# Patient Record
Sex: FEMALE | Race: WHITE | URBAN - METROPOLITAN AREA
[De-identification: names, ages, dates, MRNs, and addresses within clinical notes are randomized per-mention and may not be internally consistent; named-entity substitution may affect disease eponyms.]

---

## 2024-02-19 ENCOUNTER — TELEPHONE (OUTPATIENT)
Dept: UROGYNECOLOGY | Age: 44
End: 2024-02-19

## 2024-02-19 ENCOUNTER — OFFICE VISIT (OUTPATIENT)
Dept: UROGYNECOLOGY | Age: 44
End: 2024-02-19
Payer: COMMERCIAL

## 2024-02-19 VITALS
SYSTOLIC BLOOD PRESSURE: 136 MMHG | HEART RATE: 73 BPM | RESPIRATION RATE: 14 BRPM | DIASTOLIC BLOOD PRESSURE: 97 MMHG | TEMPERATURE: 97.9 F | OXYGEN SATURATION: 97 %

## 2024-02-19 DIAGNOSIS — R39.89 BLADDER PAIN: Primary | ICD-10-CM

## 2024-02-19 DIAGNOSIS — N30.10 INTERSTITIAL CYSTITIS: ICD-10-CM

## 2024-02-19 DIAGNOSIS — R35.0 URINARY FREQUENCY: ICD-10-CM

## 2024-02-19 LAB
BILIRUBIN, POC: NORMAL
BLOOD URINE, POC: NORMAL
CLARITY, POC: NORMAL
COLOR, POC: NORMAL
EMPTY COUGH STRESS TEST: NORMAL
FIRST SENSATION: 75 CC
FULL COUGH STRESS TEST: NORMAL
GLUCOSE URINE, POC: NORMAL
KETONES, POC: NORMAL
LEUKOCYTE EST, POC: NORMAL
MAX SENSATION: 350 CC
NITRATE, URINE POC: NORMAL
NITRITE, POC: NORMAL
PH, POC: NORMAL
POST VOID RESIDUAL (PVR): 4 ML
PROTEIN, POC: NORMAL
RBC URINE, POC: NORMAL
SECOND SENSATION: 200 CC
SPASM: NORMAL
SPECIFIC GRAVITY, POC: NORMAL
UROBILINOGEN, POC: NORMAL
WBC URINE, POC: NORMAL

## 2024-02-19 PROCEDURE — 99204 OFFICE O/P NEW MOD 45 MIN: CPT | Performed by: NURSE PRACTITIONER

## 2024-02-19 PROCEDURE — 51700 IRRIGATION OF BLADDER: CPT | Performed by: NURSE PRACTITIONER

## 2024-02-19 PROCEDURE — 81002 URINALYSIS NONAUTO W/O SCOPE: CPT | Performed by: NURSE PRACTITIONER

## 2024-02-19 PROCEDURE — 51725 SIMPLE CYSTOMETROGRAM: CPT | Performed by: NURSE PRACTITIONER

## 2024-02-19 RX ORDER — TROSPIUM CHLORIDE ER 60 MG/1
60 CAPSULE ORAL EVERY MORNING
COMMUNITY

## 2024-02-19 RX ORDER — LIDOCAINE HYDROCHLORIDE 20 MG/ML
20 INJECTION, SOLUTION INFILTRATION; PERINEURAL ONCE
Status: COMPLETED | OUTPATIENT
Start: 2024-02-19 | End: 2024-02-19

## 2024-02-19 RX ORDER — VALACYCLOVIR HYDROCHLORIDE 1 G/1
1000 TABLET, FILM COATED ORAL EVERY 12 HOURS
COMMUNITY
Start: 2020-08-18

## 2024-02-19 RX ORDER — PHENAZOPYRIDINE 200 MG/1
200 TABLET, FILM COATED ORAL
COMMUNITY
Start: 2024-01-31 | End: 2024-05-08

## 2024-02-19 RX ORDER — ACYCLOVIR 400 MG/1
400 TABLET ORAL 2 TIMES DAILY
COMMUNITY

## 2024-02-19 RX ORDER — HEPARIN SODIUM 10000 [USP'U]/ML
10000 INJECTION, SOLUTION INTRAVENOUS; SUBCUTANEOUS ONCE
Status: COMPLETED | OUTPATIENT
Start: 2024-02-19 | End: 2024-02-19

## 2024-02-19 RX ORDER — ESCITALOPRAM OXALATE 10 MG/1
10 TABLET ORAL DAILY
COMMUNITY
Start: 2013-04-28

## 2024-02-19 RX ORDER — DROSPIRENONE 4 MG/1
4 TABLET, FILM COATED ORAL DAILY
COMMUNITY
Start: 2022-11-30

## 2024-02-19 RX ORDER — NITROFURANTOIN MACROCRYSTALS 50 MG/1
CAPSULE ORAL
COMMUNITY

## 2024-02-19 RX ADMIN — HEPARIN SODIUM 10000 UNITS: 10000 INJECTION, SOLUTION INTRAVENOUS; SUBCUTANEOUS at 10:15

## 2024-02-19 RX ADMIN — LIDOCAINE HYDROCHLORIDE 20 ML: 20 INJECTION, SOLUTION INFILTRATION; PERINEURAL at 10:15

## 2024-02-19 RX ADMIN — Medication 50 MEQ: at 10:15

## 2024-02-19 ASSESSMENT — ENCOUNTER SYMPTOMS: ABDOMINAL PAIN: 1

## 2024-02-19 NOTE — PROGRESS NOTES
2/19/2024      HPI:     Name: Debo Blackwell  YOB: 1980    CC: Patient is a 43 y.o. female who is seen in self referral from internet  for evaluation of  interstitial cystitis . Patient was diagnosed 17 years ago and had a hydrodistention.  She had a UTI in the fall, and her symptoms came back. She had another hydrodistention on 1/31/24. Patient reports she has hormone replacement pellets implanted.     HPI:  Patient presents today for consultation regarding IC diagnosis and treatment.    Debo was initially diagnosed with IC in 2006.  She recalls first episode being on her honeymoon, thinking it was a UTI.  She immediately sought care and as she did not have UTI, she was scheduled for Hydrodistention 2 days later  It sounds like this hydrodistention also had medication in it, possibly DSMO (she describes an odor associated with it)    She did watch her diet for some time and then did very well without even thinking about the diagnosis for many years.    This past Fall she began with bladder pain, pressure, frequency again.  She was evaluated by Urology for a few months, given antibiotics for possible UTI (Cultures negative), scheduled for Hydrodistention and urethral dilation which took place 1/31/2024  She gained no benefit or relief from this treatment.    She returned for her 2 week follow up and was placed on Elmiron, Trospium and Uribel  She has taken \"a few trospium\", (not consecutively) but did not start the Elmiron after researching its side effects  She is being very cautious about her diet  She has had only a few good days in the past month.    Bladder control problem: No   Bladder emptying problems:  No  Prolapse/Vaginal Support problems: No  Bowel problem(s): No   Sexual History:  reports being sexually active.  Pelvic Pain:  Yes   Where is your pain? Lower Abdomen  How long have you had pelvic pain? 4 months  Is your pain relieved by bladder emptying? No  Do you have pain with urination?

## 2024-02-20 RX ORDER — METHENAMINE, SODIUM PHOSPHATE, MONOBASIC, MONOHYDRATE, PHENYL SALICYLATE, METHYLENE BLUE, AND HYOSCYAMINE SULFATE 118; 40.8; 36; 10; .12 MG/1; MG/1; MG/1; MG/1; MG/1
118 CAPSULE ORAL 4 TIMES DAILY PRN
Qty: 30 CAPSULE | Refills: 1
Start: 2024-02-20 | End: 2024-02-20 | Stop reason: CLARIF

## 2024-02-20 RX ORDER — METHENAMINE, SODIUM PHOSPHATE, MONOBASIC, MONOHYDRATE, PHENYL SALICYLATE, METHYLENE BLUE, AND HYOSCYAMINE SULFATE 118; 40.8; 36; 10; .12 MG/1; MG/1; MG/1; MG/1; MG/1
118 CAPSULE ORAL 4 TIMES DAILY PRN
Qty: 30 CAPSULE | Refills: 0 | Status: SHIPPED | OUTPATIENT
Start: 2024-02-20

## 2024-02-20 RX ORDER — METHENAMINE, SODIUM PHOSPHATE, MONOBASIC, MONOHYDRATE, PHENYL SALICYLATE, METHYLENE BLUE, AND HYOSCYAMINE SULFATE 118; 40.8; 36; 10; .12 MG/1; MG/1; MG/1; MG/1; MG/1
118 CAPSULE ORAL 4 TIMES DAILY PRN
Qty: 30 CAPSULE | Refills: 1 | Status: SHIPPED | OUTPATIENT
Start: 2024-02-20 | End: 2024-02-20 | Stop reason: SDUPTHER

## 2024-02-20 NOTE — TELEPHONE ENCOUNTER
Patient called in and would like Uribel ordered due to her urologist not sending it in. Per Leana Garner NP, Uribel sent to preferred pharmacy. Patient is thankful and has no other concerns at this time. Electronically signed by Ammy Bazan RN on 2/20/2024 at 8:22 AM

## 2024-02-21 ENCOUNTER — TELEPHONE (OUTPATIENT)
Dept: UROGYNECOLOGY | Age: 44
End: 2024-02-21

## 2024-02-21 LAB
BACTERIA UR CULT: NORMAL
C TRACH DNA SPEC QL NAA+PROBE: NOT DETECTED
CANDIDA RRNA VAG QL PROBE: NOT DETECTED
CANDIDA RRNA VAG QL PROBE: NOT DETECTED
CARBAPENEM RESISTANCE OXA-48 GENE BY PCR: NOT DETECTED
CEPHALOSPORIN RESISTANCE AMPC GENE: NOT DETECTED
ESBL RESISTANCE: NOT DETECTED
G VAGINALIS RRNA GENITAL QL PROBE: NOT DETECTED
M HOMINIS DNA BLD QL NAA+PROBE: NOT DETECTED
MACROLIDE RESISTANCE: NOT DETECTED
METHICILLIN RESISTANCE: NOT DETECTED
MYCOPLASMA DNA SPEC QL NAA+PROBE: NOT DETECTED
N GONORRHOEA DNA SPEC QL NAA+PROBE: NOT DETECTED
OTHER MICROORG DNA SPEC QL NAA+PROBE: ABNORMAL
OTHER MICROORG DNA SPEC QL NAA+PROBE: DETECTED
QUINOLONE AND FLUOROQUINOLONE RESISTANCE: NOT DETECTED
T VAGINALIS RRNA SPEC QL NAA+PROBE: NOT DETECTED
TETRACYCLINE RESISTANCE: NOT DETECTED
TRIMETHOPRIM/SULFONAMIDE RESISTANCE: NOT DETECTED

## 2024-02-21 RX ORDER — DOXYCYCLINE HYCLATE 100 MG
100 TABLET ORAL 2 TIMES DAILY
Qty: 28 TABLET | Refills: 0 | Status: SHIPPED | OUTPATIENT
Start: 2024-02-21 | End: 2024-03-06

## 2024-02-21 NOTE — TELEPHONE ENCOUNTER
Patient returned call, reviewed results and treatment.   Advised of need for partner to be treated as well and to refrain from intercourse until antibiotics completed.  Verified pharmacy and prescription sent to [facility] in [place].  Patient verbalized good understanding all, will follow up as needed.

## 2024-02-21 NOTE — TELEPHONE ENCOUNTER
Called and left message for patient to return call regarding PCR results.  Per Leana Garner CNP PCR positive for ureaplasma so patient will need to take doxycycline 100mg twice daily for 14 days.  Patient's partner will also need to be treated and she should refrain from intercourse until antibiotics completed.  Will discuss with patient when she calls back and verify pharmacy for prescription to be sent.

## 2024-02-23 ENCOUNTER — TELEPHONE (OUTPATIENT)
Dept: UROGYNECOLOGY | Age: 44
End: 2024-02-23

## 2024-02-23 NOTE — TELEPHONE ENCOUNTER
Pt recently started treatment for Ureaplasma, has first and second instillations scheduled during treatment course. Will wait to complete abx before instillations. Patient verbalizes understanding of all of the above, and has no further questions or concerns at this time. Encouraged to call back the office should any questions/concerns arise. 2/23/2024 at 3:23 PM.

## 2024-02-28 NOTE — PROGRESS NOTES
with IC in 2006 with successful treatment with Hydrodistention likely with DSMO.  Patient does not recall if Hunners lesion present, but does note at her recent Hydrodistention in January there was not.  She presents today for further consult and care.     -CMG: Reviewed with patient.  Slightly decreased capacity, no spasm, no JANIE, empties well     Urine sent for culture  Sexual health PCR sent     -Bladder pain:  Diagnosis and pathology reviewed with patient.  Discussed diagnosis based on ruling out other conditions and monitoring treatment progress.  Differential dx: UTI's vs IC vs OAB.  Patient given literature on bladder diet.  She was given bladder instillation and will return in one week to discuss outcomes.  She understands this may be a weekly treatment for some time.  She will not begin Elmiron or Trospium as given at her 2 week follow up from unsuccessful hyrdrodistention     -Referral to PFPT.  Patient understands the role this plays in her continued treatment. Advised of \"The IC solution\" book     -Urethral pain:  Urine sent for PCR   We had a long discussion regarding the differential diagnosis and did spend time discussing Ureaplasma/mycoplasma     F/U 1 week  Leana Garner, CIPRIANO - CNP      OBJECTIVE:  Ortho Screen:  Posture - mild rounded shoulders  Other Observation - WFL single limb stance  Palpation - moderate tightness in bilateral upper traps  Alignment -    ROM LEFT RIGHT Comments   Cervical Side Bend   Mild to Moderate limitations   Cervical Rotation   Mild limitations   Shoulder Flex      Shoulder Abd      Shoulder ER      Shoulder IR            Lumbar Side Bend   Mild to Moderate limitations   Lumbar Rotation   Mild limitations   Hip Flexion      Hip Abd      Hip ER      Hip IR      Hip Extension      Knee Ext      Knee Flex            Hamstring Flex    Moderate to Severe limitations   Piriformis   Moderate to Severe limitations                   Strength LEFT RIGHT Comments   Shoulder

## 2024-03-04 ENCOUNTER — TELEPHONE (OUTPATIENT)
Dept: UROGYNECOLOGY | Age: 44
End: 2024-03-04

## 2024-03-04 NOTE — TELEPHONE ENCOUNTER
Pt is scheduled for instillation this Thursday. I informed her that oral sex will not interfere with ureaplasma/Doxycyline treatment. Pt has 2 days left of doxycycline.  Patient verbalizes understanding of all of the above, and has no further questions or concerns at this time. Encouraged to call back the office should any questions/concerns arise. 3/4/2024 at 12:56 PM.

## 2024-03-06 ENCOUNTER — HOSPITAL ENCOUNTER (OUTPATIENT)
Dept: PHYSICAL THERAPY | Age: 44
Setting detail: THERAPIES SERIES
Discharge: HOME | End: 2024-03-06
Payer: COMMERCIAL

## 2024-03-06 PROCEDURE — 97140 MANUAL THERAPY 1/> REGIONS: CPT | Performed by: PHYSICAL THERAPIST

## 2024-03-06 PROCEDURE — 97162 PT EVAL MOD COMPLEX 30 MIN: CPT | Performed by: PHYSICAL THERAPIST

## 2024-03-06 PROCEDURE — 97112 NEUROMUSCULAR REEDUCATION: CPT | Performed by: PHYSICAL THERAPIST

## 2024-03-06 PROCEDURE — 97110 THERAPEUTIC EXERCISES: CPT | Performed by: PHYSICAL THERAPIST

## 2024-03-06 PROCEDURE — 97530 THERAPEUTIC ACTIVITIES: CPT | Performed by: PHYSICAL THERAPIST

## 2024-03-07 ENCOUNTER — OFFICE VISIT (OUTPATIENT)
Dept: UROGYNECOLOGY | Age: 44
End: 2024-03-07

## 2024-03-07 VITALS
SYSTOLIC BLOOD PRESSURE: 136 MMHG | HEART RATE: 64 BPM | OXYGEN SATURATION: 99 % | TEMPERATURE: 97.7 F | RESPIRATION RATE: 16 BRPM | DIASTOLIC BLOOD PRESSURE: 87 MMHG

## 2024-03-07 DIAGNOSIS — N34.1 NGU DUE TO UREAPLASMA UREALYTICUM: ICD-10-CM

## 2024-03-07 DIAGNOSIS — N30.10 INTERSTITIAL CYSTITIS: ICD-10-CM

## 2024-03-07 DIAGNOSIS — R39.89 BLADDER PAIN: Primary | ICD-10-CM

## 2024-03-07 DIAGNOSIS — A49.3 NGU DUE TO UREAPLASMA UREALYTICUM: ICD-10-CM

## 2024-03-07 DIAGNOSIS — R35.0 URINARY FREQUENCY: ICD-10-CM

## 2024-03-07 LAB
BILIRUBIN, POC: NORMAL
BLOOD URINE, POC: NORMAL
CLARITY, POC: CLEAR
COLOR, POC: YELLOW
GLUCOSE URINE, POC: NORMAL
KETONES, POC: NORMAL
LEUKOCYTE EST, POC: NORMAL
NITRITE, POC: NORMAL
PH, POC: 6.5
PROTEIN, POC: NORMAL
SPECIFIC GRAVITY, POC: 1.02
UROBILINOGEN, POC: 0.2

## 2024-03-07 RX ORDER — LIDOCAINE HYDROCHLORIDE 20 MG/ML
20 INJECTION, SOLUTION INFILTRATION; PERINEURAL ONCE
Status: COMPLETED | OUTPATIENT
Start: 2024-03-07 | End: 2024-03-07

## 2024-03-07 RX ORDER — HEPARIN SODIUM 10000 [USP'U]/ML
10000 INJECTION, SOLUTION INTRAVENOUS; SUBCUTANEOUS ONCE
Status: COMPLETED | OUTPATIENT
Start: 2024-03-07 | End: 2024-03-07

## 2024-03-07 RX ADMIN — LIDOCAINE HYDROCHLORIDE 20 ML: 20 INJECTION, SOLUTION INFILTRATION; PERINEURAL at 16:06

## 2024-03-07 RX ADMIN — HEPARIN SODIUM 10000 UNITS: 10000 INJECTION, SOLUTION INTRAVENOUS; SUBCUTANEOUS at 15:57

## 2024-03-07 RX ADMIN — Medication 5 MEQ: at 16:07

## 2024-03-09 LAB
C TRACH DNA SPEC QL NAA+PROBE: NOT DETECTED
CANDIDA RRNA VAG QL PROBE: NOT DETECTED
CANDIDA RRNA VAG QL PROBE: NOT DETECTED
CARBAPENEM RESISTANCE OXA-48 GENE BY PCR: NOT DETECTED
CEPHALOSPORIN RESISTANCE AMPC GENE: NOT DETECTED
ESBL RESISTANCE: NOT DETECTED
G VAGINALIS RRNA GENITAL QL PROBE: NOT DETECTED
M HOMINIS DNA BLD QL NAA+PROBE: NOT DETECTED
MACROLIDE RESISTANCE: NOT DETECTED
METHICILLIN RESISTANCE: NOT DETECTED
MYCOPLASMA DNA SPEC QL NAA+PROBE: NOT DETECTED
N GONORRHOEA DNA SPEC QL NAA+PROBE: NOT DETECTED
OTHER MICROORG DNA SPEC QL NAA+PROBE: ABNORMAL
OTHER MICROORG DNA SPEC QL NAA+PROBE: NOT DETECTED
QUINOLONE AND FLUOROQUINOLONE RESISTANCE: NOT DETECTED
T VAGINALIS RRNA SPEC QL NAA+PROBE: NOT DETECTED
TETRACYCLINE RESISTANCE: ABNORMAL
TRIMETHOPRIM/SULFONAMIDE RESISTANCE: NOT DETECTED

## 2024-03-12 NOTE — PROGRESS NOTES
diaphragmatic breathing and targeted stretches.       GOALS:  Patient stated goal:  little to no pain daily and with sex    [x] Progressing: [] Met: [] Not Met: [] Adjusted        Therapist goals for Patient:      Short Term Goals: To be achieved in: 4-5 sessions     1. Patient will have a decrease in pelvic/abdominal/bladder pain and/or urinary urgency and frequency  to indicate improvement in pelvic floor strength/motor control and relaxation, muscle coordination, improved intraabdominal/intrapelvic pressures, and/or bladder retraining.  [x] Progressing: [] Met: [] Not Met: [] Adjusted  2. Perform HEP for pelvic floor and core muscle groups with minimal direction from therapist as she progresses to become more independent managing her intraabdominal/intrapelvic pressure and PF and surrounding core muscle weakness and/or tightness.  [x] Progressing: [] Met: [] Not Met: [] Adjusted  3. Report using 1-2 behavioral modification strategies to reduce urinary/bowel complaints through dietary and mechanical changes, with focus on full emptying of bladder with each urination and using optimal positioning and deep breathing for relaxation of posterior PF muscles during defacation, reducing need to strain.  [x] Progressing: [] Met: [] Not Met: [] Adjusted     Long Term Goals: To be achieved in: 8-10 sessions     1. 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  [] Progressing: [] Met: [] Not Met: [] Adjusted  2. Patient will report

## 2024-03-14 ENCOUNTER — HOSPITAL ENCOUNTER (OUTPATIENT)
Dept: PHYSICAL THERAPY | Age: 44
Setting detail: THERAPIES SERIES
Discharge: HOME | End: 2024-03-14
Payer: COMMERCIAL

## 2024-03-14 ENCOUNTER — OFFICE VISIT (OUTPATIENT)
Dept: UROGYNECOLOGY | Age: 44
End: 2024-03-14

## 2024-03-14 VITALS
RESPIRATION RATE: 16 BRPM | HEART RATE: 66 BPM | TEMPERATURE: 98.3 F | SYSTOLIC BLOOD PRESSURE: 135 MMHG | DIASTOLIC BLOOD PRESSURE: 88 MMHG | OXYGEN SATURATION: 97 %

## 2024-03-14 DIAGNOSIS — R39.89 BLADDER PAIN: ICD-10-CM

## 2024-03-14 DIAGNOSIS — N30.10 INTERSTITIAL CYSTITIS: Primary | ICD-10-CM

## 2024-03-14 LAB
BILIRUBIN, POC: NORMAL
BLOOD URINE, POC: NORMAL
CLARITY, POC: CLEAR
COLOR, POC: YELLOW
GLUCOSE URINE, POC: NORMAL
KETONES, POC: NORMAL
LEUKOCYTE EST, POC: NORMAL
NITRITE, POC: NORMAL
PH, POC: 6.5
PROTEIN, POC: NORMAL
SPECIFIC GRAVITY, POC: 1.01
UROBILINOGEN, POC: NORMAL

## 2024-03-14 PROCEDURE — 97112 NEUROMUSCULAR REEDUCATION: CPT | Performed by: PHYSICAL THERAPIST

## 2024-03-14 PROCEDURE — 97530 THERAPEUTIC ACTIVITIES: CPT | Performed by: PHYSICAL THERAPIST

## 2024-03-14 PROCEDURE — 97110 THERAPEUTIC EXERCISES: CPT | Performed by: PHYSICAL THERAPIST

## 2024-03-14 RX ORDER — LIDOCAINE HYDROCHLORIDE 20 MG/ML
20 INJECTION, SOLUTION INFILTRATION; PERINEURAL ONCE
Status: COMPLETED | OUTPATIENT
Start: 2024-03-14 | End: 2024-03-14

## 2024-03-14 RX ORDER — METHENAMINE, SODIUM PHOSPHATE, MONOBASIC, MONOHYDRATE, PHENYL SALICYLATE, METHYLENE BLUE, AND HYOSCYAMINE SULFATE 118; 40.8; 36; 10; .12 MG/1; MG/1; MG/1; MG/1; MG/1
118 CAPSULE ORAL 4 TIMES DAILY PRN
Qty: 30 CAPSULE | Refills: 1 | Status: SHIPPED | OUTPATIENT
Start: 2024-03-14

## 2024-03-14 RX ORDER — HEPARIN SODIUM 10000 [USP'U]/ML
10000 INJECTION, SOLUTION INTRAVENOUS; SUBCUTANEOUS ONCE
Status: COMPLETED | OUTPATIENT
Start: 2024-03-14 | End: 2024-03-14

## 2024-03-14 RX ADMIN — Medication 50 MEQ: at 15:15

## 2024-03-14 RX ADMIN — HEPARIN SODIUM 10000 UNITS: 10000 INJECTION, SOLUTION INTRAVENOUS; SUBCUTANEOUS at 15:15

## 2024-03-14 RX ADMIN — LIDOCAINE HYDROCHLORIDE 20 ML: 20 INJECTION, SOLUTION INFILTRATION; PERINEURAL at 15:15

## 2024-03-14 NOTE — PROGRESS NOTES
was asked to retain the instillation in their bladder for a minimum of 1 hour and should not exceed 3 hours.  Patient tolerated the procedure well.  She is to call the office with any concerns.    Results for POC orders placed in visit on 03/14/24   POCT Urinalysis no Micro   Result Value Ref Range    Color, UA yellow     Clarity, UA clear     Glucose, UA POC neg     Bilirubin, UA neg     Ketones, UA neg     Spec Grav, UA 1.015     Blood, UA POC neg     pH, UA 6.5     Protein, UA POC neg     Urobilinogen, UA neg     Leukocytes, UA neg     Nitrite, UA neg        Assessment/Plan:     Debo Blackwell is a 43 y.o. female with   1. Interstitial cystitis    2. Bladder pain    Bladder pain:  Finding continued improvement with instillations and PFPT  She is pleased with her outcomes   Continues to follow bladder diet  Will continue weekly instillations at this time  CIPRIANO Garcia CNP      Orders Placed This Encounter   Procedures    POCT Urinalysis no Micro     Orders Placed This Encounter   Medications    hydrocortisone sodium succinate PF (SOLU-CORTEF) injection 100 mg    lidocaine 2 % injection 20 mL    heparin (porcine) injection 10,000 Units    sodium bicarbonate 8.4 % injection 50 mEq    Meth-Hyo-M Bl-Na Phos-Ph Sal (URIBEL) 118 MG CAPS     Sig: Take 118 mg by mouth 4 times daily as needed (as needed for bladder pain)     Dispense:  30 capsule     Refill:  1       CIPRIANO Garcia CNP

## 2024-03-20 NOTE — PROGRESS NOTES
afterwards.    [] Progressing: [] Met: [] Not Met: [] Adjusted  3. 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   [] Progressing: [] Met: [] Not Met: [] Adjusted  4. Patient will demonstrate improved confidence in her ability to manage bladder symptoms reporting little to no pain with daily activities and after sex.   [] Progressing: [] Met: [] Not Met: [] Adjusted       5. Report using 3-4 behavioral modification strategies to reduce urinary/bowel complaints through dietary and mechanical changes, with focus on full emptying of bladder with each urination and using optimal positioning and deep breathing for relaxation of posterior PF muscles during defacation, reducing need to strain.  [] Progressing: [] Met: [] Not Met: [] Adjusted       6. Rate severity of the problem related to urinary frequency and urgency and/or pelvic pain to 3 or less on scale of 0-10 with 0 being no problem and 10 being the worst - (8/10 reported at intial eval).  [] Progressing: [] Met: [] Not Met: [] Adjusted       7. Perform HEP for pelvic floor and core muscle groups independently as she progresses to self-manage her pelvic pressure/pain and PF and surrounding core muscle weakness and/or tightness.  [] Progressing: [] Met: [] Not Met: [] Adjusted                      Plan:   Continue per plan of care     Review and offer modifications as requested for current stretches and integrated home program.     Continued education on multifactorial contributions to increased symptoms/flares.     Reassess and address PF muscle tissues externally and transvaginally as patient consents.      Review and advance core stabilization activities while monitoring for response in PF muscles, ensuring tightness and tension does not return to previous levels.       Frequency/Duration:     Recommend see patient every ~2 weeks

## 2024-03-21 ENCOUNTER — OFFICE VISIT (OUTPATIENT)
Dept: UROGYNECOLOGY | Age: 44
End: 2024-03-21
Payer: COMMERCIAL

## 2024-03-21 VITALS
RESPIRATION RATE: 16 BRPM | TEMPERATURE: 98 F | HEART RATE: 84 BPM | DIASTOLIC BLOOD PRESSURE: 87 MMHG | SYSTOLIC BLOOD PRESSURE: 135 MMHG | OXYGEN SATURATION: 97 %

## 2024-03-21 DIAGNOSIS — R39.89 BLADDER PAIN: ICD-10-CM

## 2024-03-21 DIAGNOSIS — N30.10 INTERSTITIAL CYSTITIS: Primary | ICD-10-CM

## 2024-03-21 PROCEDURE — 51700 IRRIGATION OF BLADDER: CPT | Performed by: NURSE PRACTITIONER

## 2024-03-21 PROCEDURE — 81002 URINALYSIS NONAUTO W/O SCOPE: CPT | Performed by: NURSE PRACTITIONER

## 2024-03-21 RX ORDER — LIDOCAINE HYDROCHLORIDE 20 MG/ML
40 INJECTION, SOLUTION INFILTRATION; PERINEURAL ONCE
Status: DISCONTINUED | OUTPATIENT
Start: 2024-03-21 | End: 2024-03-21

## 2024-03-21 RX ORDER — LIDOCAINE HYDROCHLORIDE 10 MG/ML
40 INJECTION, SOLUTION INFILTRATION; PERINEURAL ONCE
Status: COMPLETED | OUTPATIENT
Start: 2024-03-21 | End: 2024-03-21

## 2024-03-21 RX ORDER — HEPARIN SODIUM 10000 [USP'U]/ML
10000 INJECTION, SOLUTION INTRAVENOUS; SUBCUTANEOUS ONCE
Status: COMPLETED | OUTPATIENT
Start: 2024-03-21 | End: 2024-03-21

## 2024-03-21 RX ADMIN — LIDOCAINE HYDROCHLORIDE 40 ML: 10 INJECTION, SOLUTION INFILTRATION; PERINEURAL at 15:44

## 2024-03-21 RX ADMIN — HEPARIN SODIUM 10000 UNITS: 10000 INJECTION, SOLUTION INTRAVENOUS; SUBCUTANEOUS at 15:46

## 2024-03-21 RX ADMIN — Medication 50 MEQ: at 15:45

## 2024-03-21 NOTE — PROGRESS NOTES
3/21/2024     HPI:     Name: Debo Blackwell  YOB: 1980    CC: Debo Blackwell is a 43 y.o. female who is here for follow up of bladder pain.  HPI: How long have you had this problem?   Please rate the severity of your problem:  mild  Anything make it better? Instillation #3; per patient, the instillations are helping.     Ob/Gyn History:    OB History    Para Term  AB Living   2 2       3   SAB IAB Ectopic Molar Multiple Live Births           1 3      # Outcome Date GA Lbr Shahzad/2nd Weight Sex Delivery Anes PTL Lv   2A Para     M CS-LTranv  N SAMANTA      Birth Comments: twins   2B Para     M CS-LTranv  N SAMANTA   1 Para    3.345 kg (7 lb 6 oz)  Vag-Spont  N SAMANTA     Past Medical History:   Past Medical History:   Diagnosis Date    Interstitial cystitis      Past Surgical History: History reviewed. No pertinent surgical history.  Current Medications:  Current Outpatient Medications   Medication Sig Dispense Refill    Meth-Hyo-M Bl-Na Phos-Ph Sal (URIBEL) 118 MG CAPS Take 118 mg by mouth 4 times daily as needed (as needed for bladder pain) 30 capsule 1    Meth-Hyo-M Bl-Na Phos-Ph Sal (URIBEL) 118 MG CAPS Take 118 mg by mouth 4 times daily as needed (as needed for bladder pain) 30 capsule 0    acyclovir (ZOVIRAX) 400 MG tablet Take 1 tablet by mouth 2 times daily      Drospirenone (SLYND) 4 MG TABS Take 4 mg by mouth daily      escitalopram (LEXAPRO) 10 MG tablet Take 1 tablet by mouth daily      nitrofurantoin (MACRODANTIN) 50 MG capsule TAKE ONE CAPSULE AT BEDTIME AFTER INTERCOURSE AND DURING MENSES (Patient not taking: Reported on 3/7/2024)      PYRIDIUM 200 MG tablet Take 1 tablet by mouth (Patient not taking: Reported on 3/14/2024)      SPIRONOLACTONE PO Take 100 mg by mouth (Patient not taking: Reported on 3/7/2024)      trospium (SANCTURA) 60 MG CP24 extended release capsule Take 1 capsule by mouth every morning (Patient not taking: Reported on 3/7/2024)      valACYclovir (VALTREX) 1 g

## 2024-03-27 ENCOUNTER — OFFICE VISIT (OUTPATIENT)
Dept: UROGYNECOLOGY | Age: 44
End: 2024-03-27

## 2024-03-27 ENCOUNTER — HOSPITAL ENCOUNTER (OUTPATIENT)
Dept: PHYSICAL THERAPY | Age: 44
Setting detail: THERAPIES SERIES
Discharge: HOME | End: 2024-03-27
Payer: COMMERCIAL

## 2024-03-27 VITALS
RESPIRATION RATE: 16 BRPM | OXYGEN SATURATION: 99 % | DIASTOLIC BLOOD PRESSURE: 87 MMHG | SYSTOLIC BLOOD PRESSURE: 129 MMHG | HEART RATE: 67 BPM | TEMPERATURE: 98.1 F

## 2024-03-27 DIAGNOSIS — N30.10 INTERSTITIAL CYSTITIS: Primary | ICD-10-CM

## 2024-03-27 DIAGNOSIS — R39.89 BLADDER PAIN: ICD-10-CM

## 2024-03-27 PROCEDURE — 97530 THERAPEUTIC ACTIVITIES: CPT | Performed by: PHYSICAL THERAPIST

## 2024-03-27 PROCEDURE — 97112 NEUROMUSCULAR REEDUCATION: CPT | Performed by: PHYSICAL THERAPIST

## 2024-03-27 PROCEDURE — 97110 THERAPEUTIC EXERCISES: CPT | Performed by: PHYSICAL THERAPIST

## 2024-03-27 RX ORDER — LIDOCAINE HYDROCHLORIDE 20 MG/ML
40 INJECTION, SOLUTION INFILTRATION; PERINEURAL ONCE
Status: COMPLETED | OUTPATIENT
Start: 2024-03-27 | End: 2024-03-27

## 2024-03-27 RX ORDER — HEPARIN SODIUM 10000 [USP'U]/ML
10000 INJECTION, SOLUTION INTRAVENOUS; SUBCUTANEOUS ONCE
Status: COMPLETED | OUTPATIENT
Start: 2024-03-27 | End: 2024-03-27

## 2024-03-27 RX ADMIN — HEPARIN SODIUM 10000 UNITS: 10000 INJECTION, SOLUTION INTRAVENOUS; SUBCUTANEOUS at 11:45

## 2024-03-27 RX ADMIN — Medication 5 MEQ: at 12:25

## 2024-03-27 RX ADMIN — LIDOCAINE HYDROCHLORIDE 40 ML: 20 INJECTION, SOLUTION INFILTRATION; PERINEURAL at 12:27

## 2024-03-27 NOTE — PROGRESS NOTES
Cardiovascular:      Rate and Rhythm: Normal rate.   Pulmonary:      Effort: Pulmonary effort is normal.   Musculoskeletal:         General: Normal range of motion.      Cervical back: Normal range of motion.   Skin:     General: Skin is warm and dry.   Neurological:      General: No focal deficit present.      Mental Status: She is alert.   Psychiatric:         Mood and Affect: Mood normal.         Behavior: Behavior normal.       Patient counseled and consented on bladder instillation.  Urethra was cleaned in sterile fashion.  The urethra was then anesthetized using lidocaine gel.  Using a sterile catheter, the bladder was first drained then instilled with a medication solution containing heparin sodium 10,000 u, Sol-Cortef 100 mg, 8.4% Sodium Bicarbonate 5 ml, and Lidocaine 2 % 40 ml.  Once the entire volume of the solution was instilled, the catheter was removed.  The patient was asked to retain the instillation in their bladder for a minimum of 1 hour and should not exceed 3 hours.  Patient tolerated the procedure well.  She is to call the office with any concerns.    Straight urinary catheterization performed by sterile procedure for urine specimen per Leana Garner NP.  40ml post void.  Patient tolerated well.  Leana Garner NP made aware.       Results for POC orders placed in visit on 03/27/24   POCT Urinalysis no Micro   Result Value Ref Range    Color, UA yellow     Clarity, UA clear     Glucose, UA POC neg     Bilirubin, UA neg     Ketones, UA neg     Spec Grav, UA 1.020     Blood, UA POC neg     pH, UA 6.5     Protein, UA POC neg     Urobilinogen, UA 0.2     Leukocytes, UA neg     Nitrite, UA neg        Assessment/Plan:     Debo Blackwell is a 43 y.o. female with   1. Interstitial cystitis    2. Bladder pain      -IC:  patient given bladder instillation.  She continues to progress with this treatment  She continues PFPT and dietary restrictions.  She will return weekly for evaluation  Leana Garner

## 2024-03-28 NOTE — PROGRESS NOTES
[facility] - Outpatient Physical Therapy  [address] [place] [address]  Phone: [phone]  Fax: [phone]        Physical Therapy Daily Treatment Note    Date: 04/10/2024    Patient Name: Debo Blackwell : 1980 MRN: [id number]    Restrictions/Precautions:    Medical/Treatment Diagnosis Information:    Medical Diagnosis:  Bladder pain [R39.89]  Interstitial cystitis [N30.10]       Insurance/Certification information: PT Insurance Information: [employer] - $50 co-pay, 80/20% coverage -   **2024**ANTHEM/CARELON  FIVE (5) VISITS APPROVED; VALID  THRU 2024    Physician Information: Leana Garner APRN*    Plan of care signed (Y/N): Y  Cosigned by: Leana Garner APRN - CNP at 3/7/2024  8:17 AM     Visit# / total visits: 4/10 (total estimated)    3/5 approved - FIVE (5) VISITS APPROVED; VALID  THRU 2024    Time in: 1605  Time Out: 1715  Total Treatment Time:  70 minutes    Charges:  Therapeutic Activity (52182) x3    Therapeutic Exercise (38726) -    Manual (94663) x2    Neuromuscular Re-ed/Facilitation (92162) -  ________________________________________________________________________________________    Pain Level: mentioned lingering pain after recent vaginal penetration with intercourse    SUBJECTIVE:     Patient reports overall doing \"pretty good\" - more lenient on diet with little effect on bladder symptoms.     Last week went to get bladder instillation and waiting 2 weeks before her next instillation is scheduled.     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     Patient reports using breathing techniques after intercourse - \"just a little irritated through the night, pretty much gone the next day\".     Feels reassured about her knowledge base about painful bladder symptoms.       Performing stretches generally once a day, generally at the end of the day. Wants to make sure she is maximize her benefits.

## 2024-04-02 ENCOUNTER — OFFICE VISIT (OUTPATIENT)
Dept: UROGYNECOLOGY | Age: 44
End: 2024-04-02
Payer: COMMERCIAL

## 2024-04-02 VITALS
DIASTOLIC BLOOD PRESSURE: 81 MMHG | OXYGEN SATURATION: 96 % | RESPIRATION RATE: 16 BRPM | HEART RATE: 67 BPM | TEMPERATURE: 98 F | SYSTOLIC BLOOD PRESSURE: 126 MMHG

## 2024-04-02 DIAGNOSIS — N30.10 INTERSTITIAL CYSTITIS: ICD-10-CM

## 2024-04-02 DIAGNOSIS — R39.89 BLADDER PAIN: Primary | ICD-10-CM

## 2024-04-02 LAB
BILIRUBIN, POC: NORMAL
BLOOD URINE, POC: NORMAL
CLARITY, POC: CLEAR
COLOR, POC: YELLOW
GLUCOSE URINE, POC: NORMAL
KETONES, POC: NORMAL
LEUKOCYTE EST, POC: NORMAL
NITRITE, POC: NORMAL
PH, POC: 6
PROTEIN, POC: NORMAL
SPECIFIC GRAVITY, POC: 1.02
UROBILINOGEN, POC: 0.1

## 2024-04-02 PROCEDURE — 51700 IRRIGATION OF BLADDER: CPT | Performed by: NURSE PRACTITIONER

## 2024-04-02 PROCEDURE — 81002 URINALYSIS NONAUTO W/O SCOPE: CPT | Performed by: NURSE PRACTITIONER

## 2024-04-02 RX ORDER — HEPARIN SODIUM 10000 [USP'U]/ML
10000 INJECTION, SOLUTION INTRAVENOUS; SUBCUTANEOUS ONCE
Status: COMPLETED | OUTPATIENT
Start: 2024-04-02 | End: 2024-04-02

## 2024-04-02 RX ORDER — LIDOCAINE HYDROCHLORIDE 10 MG/ML
40 INJECTION, SOLUTION INFILTRATION; PERINEURAL ONCE
Status: COMPLETED | OUTPATIENT
Start: 2024-04-02 | End: 2024-04-02

## 2024-04-02 RX ADMIN — HEPARIN SODIUM 10000 UNITS: 10000 INJECTION, SOLUTION INTRAVENOUS; SUBCUTANEOUS at 15:36

## 2024-04-02 RX ADMIN — LIDOCAINE HYDROCHLORIDE 40 ML: 10 INJECTION, SOLUTION INFILTRATION; PERINEURAL at 15:38

## 2024-04-02 RX ADMIN — Medication 5 MEQ: at 15:38

## 2024-04-02 NOTE — PROGRESS NOTES
solution was instilled, the catheter was removed.  The patient was asked to retain the instillation in their bladder for a minimum of 1 hour and should not exceed 3 hours.  Patient tolerated the procedure well.  She is to call the office with any concerns.    Results for POC orders placed in visit on 04/02/24   POCT Urinalysis no Micro   Result Value Ref Range    Color, UA yellow     Clarity, UA clear     Glucose, UA POC neg     Bilirubin, UA neg     Ketones, UA neg     Spec Grav, UA 1.020     Blood, UA POC neg     pH, UA 6     Protein, UA POC neg     Urobilinogen, UA 0.1     Leukocytes, UA neg     Nitrite, UA neg        Assessment/Plan:     Debo Blackwell is a 43 y.o. female with   1. Bladder pain    2. Interstitial cystitis    -IC/PBS:  patient is progressing well.  She continues PFPT, continues to monitor diet  She was given instillation today and at this time will schedule next treatment out 2 weeks  CIPRIANO Garcia CNP      Orders Placed This Encounter   Procedures    POCT Urinalysis no Micro     Orders Placed This Encounter   Medications    heparin (porcine) injection 10,000 Units    lidocaine 1 % injection 40 mL    hydrocortisone sodium succinate PF (SOLU-CORTEF) injection 100 mg    sodium bicarbonate 8.4 % injection 5 mEq       CIPRIANO Garcia CNP

## 2024-04-10 ENCOUNTER — HOSPITAL ENCOUNTER (OUTPATIENT)
Dept: PHYSICAL THERAPY | Age: 44
Setting detail: THERAPIES SERIES
Discharge: HOME | End: 2024-04-10
Payer: COMMERCIAL

## 2024-04-10 PROCEDURE — 97530 THERAPEUTIC ACTIVITIES: CPT | Performed by: PHYSICAL THERAPIST

## 2024-04-10 PROCEDURE — 97140 MANUAL THERAPY 1/> REGIONS: CPT | Performed by: PHYSICAL THERAPIST

## 2024-04-15 NOTE — PROGRESS NOTES
anesthetized using lidocaine gel.  Using a sterile catheter, the bladder was first drained then instilled with a medication solution containing heparin sodium 10,000 u, Sol-Cortef 100 mg, 8.4% Sodium Bicarbonate 5 ml, and Lidocaine 1 % 40 ml.  Once the entire volume of the solution was instilled, the catheter was removed.  The patient was asked to retain the instillation in their bladder for a minimum of 1 hour and should not exceed 3 hours.  Patient tolerated the procedure well.  She is to call the office with any concerns.    No results found for this visit on 04/16/24.    Assessment/Plan:     Debo Blackwell is a 43 y.o. female with   1. Bladder pain    2. Interstitial cystitis    -IC/PBS:  Debo has progressed well with instillations and PFPT.   She continues to monitor diet  She was given instillation today and at this time will return prn for bladder pain  Leana Garner, APRN - CNP      Orders Placed This Encounter   Procedures    POCT Urinalysis no Micro     Orders Placed This Encounter   Medications    lidocaine 1 % injection 20 mL    heparin (porcine) injection 10,000 Units    sodium bicarbonate 8.4 % injection 50 mEq    hydrocortisone sodium succinate PF (SOLU-CORTEF) injection 100 mg       Ammy Bazan RN

## 2024-04-16 ENCOUNTER — OFFICE VISIT (OUTPATIENT)
Dept: UROGYNECOLOGY | Age: 44
End: 2024-04-16
Payer: COMMERCIAL

## 2024-04-16 VITALS
OXYGEN SATURATION: 97 % | DIASTOLIC BLOOD PRESSURE: 78 MMHG | SYSTOLIC BLOOD PRESSURE: 126 MMHG | RESPIRATION RATE: 16 BRPM | HEART RATE: 81 BPM | TEMPERATURE: 98.7 F

## 2024-04-16 DIAGNOSIS — N30.10 INTERSTITIAL CYSTITIS: ICD-10-CM

## 2024-04-16 DIAGNOSIS — R39.89 BLADDER PAIN: Primary | ICD-10-CM

## 2024-04-16 PROCEDURE — 81002 URINALYSIS NONAUTO W/O SCOPE: CPT | Performed by: NURSE PRACTITIONER

## 2024-04-16 PROCEDURE — 51700 IRRIGATION OF BLADDER: CPT | Performed by: NURSE PRACTITIONER

## 2024-04-16 RX ORDER — HEPARIN SODIUM 10000 [USP'U]/ML
10000 INJECTION, SOLUTION INTRAVENOUS; SUBCUTANEOUS ONCE
Status: COMPLETED | OUTPATIENT
Start: 2024-04-16 | End: 2024-04-16

## 2024-04-16 RX ORDER — LIDOCAINE HYDROCHLORIDE 10 MG/ML
20 INJECTION, SOLUTION INFILTRATION; PERINEURAL ONCE
Status: DISCONTINUED | OUTPATIENT
Start: 2024-04-16 | End: 2024-04-16

## 2024-04-16 RX ORDER — LIDOCAINE HYDROCHLORIDE 10 MG/ML
40 INJECTION, SOLUTION INFILTRATION; PERINEURAL ONCE
Status: COMPLETED | OUTPATIENT
Start: 2024-04-16 | End: 2024-04-16

## 2024-04-16 RX ADMIN — LIDOCAINE HYDROCHLORIDE 40 ML: 10 INJECTION, SOLUTION INFILTRATION; PERINEURAL at 15:38

## 2024-04-16 RX ADMIN — HEPARIN SODIUM 10000 UNITS: 10000 INJECTION, SOLUTION INTRAVENOUS; SUBCUTANEOUS at 15:40

## 2024-04-16 RX ADMIN — Medication 50 MEQ: at 15:39

## 2024-04-17 NOTE — PROGRESS NOTES
no pain after vaginal penetration for intercourse with most two recent episodes, but flare of symptoms did start the day after her most recent interaction with intercourse, so may have been one of many contributing factors.     Patient consented to transvaginal and external manual interventions to reassess and address ongoing PF muscle tightness and tension.  Patient continue with generally moderate tightness and tenderness nidia in deep PF bilaterally, L>R.  She responded well to manual interventions both transvaginally and externally in prone to access PF muscles from posterior aspect.    Patient would continue to benefit from pelvic floor physical therapy for manual interventions, continued education on healthy bladder/bowel habits and adjustment of behavioral modifications as well as advanced activities to improve muscle control/ coordination and endurance of pelvic floor, core, and hip muscles to decrease pelvic/abdominal/bladder pain,  urinary frequency and urgency, and  constipation.     Treatment/Activity Tolerance:    Patient tolerated treatment well     Increasing appreciation of multifactorial contributions to bladder symptoms, nidia with most recent flare.      Continues to respond well to down regulation of nervous system, diaphragmatic breathing and targeted stretches.     Continues to respond well to core stabilization activities and appreciated need for attending to this during daily functional tasks.     Tolerated manual interventions with noted moderate release of tension after extensive manual interventions.         GOALS:  Patient stated goal:  little to no pain daily and with sex    [x] Progressing: [] Met: [] Not Met: [] Adjusted        Therapist goals for Patient:      Short Term Goals: To be achieved in: 4-5 sessions     1. Patient will have a decrease in pelvic/abdominal/bladder pain and/or urinary urgency and frequency  to indicate improvement in pelvic floor strength/motor control and

## 2024-04-23 ENCOUNTER — TELEPHONE (OUTPATIENT)
Dept: UROGYNECOLOGY | Age: 44
End: 2024-04-23

## 2024-04-23 ENCOUNTER — OFFICE VISIT (OUTPATIENT)
Dept: UROGYNECOLOGY | Age: 44
End: 2024-04-23
Payer: COMMERCIAL

## 2024-04-23 VITALS
RESPIRATION RATE: 16 BRPM | SYSTOLIC BLOOD PRESSURE: 119 MMHG | TEMPERATURE: 98.1 F | DIASTOLIC BLOOD PRESSURE: 80 MMHG | HEART RATE: 70 BPM | OXYGEN SATURATION: 97 %

## 2024-04-23 DIAGNOSIS — N30.10 INTERSTITIAL CYSTITIS: ICD-10-CM

## 2024-04-23 DIAGNOSIS — R39.89 BLADDER PAIN: Primary | ICD-10-CM

## 2024-04-23 LAB
BILIRUBIN, POC: NORMAL
BLOOD URINE, POC: NORMAL
CLARITY, POC: NORMAL
COLOR, POC: NORMAL
GLUCOSE URINE, POC: NORMAL
KETONES, POC: NORMAL
LEUKOCYTE EST, POC: NORMAL
NITRITE, POC: NORMAL
PH, POC: NORMAL
PROTEIN, POC: NORMAL
SPECIFIC GRAVITY, POC: NORMAL
UROBILINOGEN, POC: NORMAL

## 2024-04-23 PROCEDURE — 99213 OFFICE O/P EST LOW 20 MIN: CPT | Performed by: NURSE PRACTITIONER

## 2024-04-23 PROCEDURE — 81002 URINALYSIS NONAUTO W/O SCOPE: CPT | Performed by: NURSE PRACTITIONER

## 2024-04-23 PROCEDURE — 51700 IRRIGATION OF BLADDER: CPT | Performed by: NURSE PRACTITIONER

## 2024-04-23 RX ORDER — HEPARIN SODIUM 10000 [USP'U]/ML
10000 INJECTION, SOLUTION INTRAVENOUS; SUBCUTANEOUS ONCE
Status: COMPLETED | OUTPATIENT
Start: 2024-04-23 | End: 2024-04-23

## 2024-04-23 RX ORDER — LIDOCAINE HYDROCHLORIDE 10 MG/ML
40 INJECTION, SOLUTION INFILTRATION; PERINEURAL ONCE
Status: COMPLETED | OUTPATIENT
Start: 2024-04-23 | End: 2024-04-23

## 2024-04-23 RX ADMIN — HEPARIN SODIUM 10000 UNITS: 10000 INJECTION, SOLUTION INTRAVENOUS; SUBCUTANEOUS at 15:35

## 2024-04-23 RX ADMIN — LIDOCAINE HYDROCHLORIDE 40 ML: 10 INJECTION, SOLUTION INFILTRATION; PERINEURAL at 15:37

## 2024-04-23 RX ADMIN — Medication 5 MEQ: at 15:36

## 2024-04-23 NOTE — TELEPHONE ENCOUNTER
Pt has been drinking lots of water since Saturday, however her IC pain is creeping back. Patient added to schedule for tomorrow.     Patient verbalizes understanding of all of the above, and has no further questions or concerns at this time. Encouraged to call back the office should any questions/concerns arise. 4/23/2024 at 8:57 AM.

## 2024-04-23 NOTE — PROGRESS NOTES
2024     HPI:     Name: Debo Blackwell  YOB: 1980    CC: Debo Blackwell is a 43 y.o. female who is here for follow up of bladder pain.    HPI:     How long have you had this problem?    Please rate the severity of your problem:  moderate  Anything make it better? Instillations are helpful per patient    Debo was to return PRN for bladder pain and had been doing very well  She presents today with bladder pain beginning 2 days ago. She attempted to push water but has had no relief. She is uncertain if she has UTI or IC flare  Can not think of anything particular that may have proceeded the pain    Ob/Gyn History:    OB History    Para Term  AB Living   2 2       3   SAB IAB Ectopic Molar Multiple Live Births           1 3      # Outcome Date GA Lbr Shahzad/2nd Weight Sex Delivery Anes PTL Lv   2A Para     M CS-LTranv  N SAMANTA      Birth Comments: twins   2B Para     M CS-LTranv  N SAMANTA   1 Para    3.345 kg (7 lb 6 oz)  Vag-Spont  N SAMANTA     Past Medical History:   Past Medical History:   Diagnosis Date    Interstitial cystitis      Past Surgical History: History reviewed. No pertinent surgical history.  Current Medications:  Current Outpatient Medications   Medication Sig Dispense Refill    Meth-Hyo-M Bl-Na Phos-Ph Sal (URIBEL) 118 MG CAPS Take 118 mg by mouth 4 times daily as needed (as needed for bladder pain) 30 capsule 1    Meth-Hyo-M Bl-Na Phos-Ph Sal (URIBEL) 118 MG CAPS Take 118 mg by mouth 4 times daily as needed (as needed for bladder pain) 30 capsule 0    acyclovir (ZOVIRAX) 400 MG tablet Take 1 tablet by mouth 2 times daily      escitalopram (LEXAPRO) 10 MG tablet Take 1 tablet by mouth daily      nitrofurantoin (MACRODANTIN) 50 MG capsule       PYRIDIUM 200 MG tablet Take 1 tablet by mouth      SPIRONOLACTONE PO Take 100 mg by mouth      trospium (SANCTURA) 60 MG CP24 extended release capsule Take 1 capsule by mouth every morning      valACYclovir (VALTREX) 1 g tablet Take

## 2024-04-24 ENCOUNTER — HOSPITAL ENCOUNTER (OUTPATIENT)
Dept: PHYSICAL THERAPY | Age: 44
Setting detail: THERAPIES SERIES
Discharge: HOME | End: 2024-04-24
Payer: COMMERCIAL

## 2024-04-24 PROCEDURE — 97140 MANUAL THERAPY 1/> REGIONS: CPT | Performed by: PHYSICAL THERAPIST

## 2024-04-24 PROCEDURE — 97530 THERAPEUTIC ACTIVITIES: CPT | Performed by: PHYSICAL THERAPIST

## 2024-04-26 ENCOUNTER — OFFICE VISIT (OUTPATIENT)
Dept: UROGYNECOLOGY | Age: 44
End: 2024-04-26

## 2024-04-26 VITALS
OXYGEN SATURATION: 98 % | RESPIRATION RATE: 16 BRPM | TEMPERATURE: 98.2 F | DIASTOLIC BLOOD PRESSURE: 90 MMHG | SYSTOLIC BLOOD PRESSURE: 142 MMHG | HEART RATE: 79 BPM

## 2024-04-26 DIAGNOSIS — R39.89 URETHRAL PAIN: ICD-10-CM

## 2024-04-26 DIAGNOSIS — A49.3 NGU DUE TO UREAPLASMA UREALYTICUM: ICD-10-CM

## 2024-04-26 DIAGNOSIS — N30.10 INTERSTITIAL CYSTITIS: ICD-10-CM

## 2024-04-26 DIAGNOSIS — N34.1 NGU DUE TO UREAPLASMA UREALYTICUM: ICD-10-CM

## 2024-04-26 DIAGNOSIS — R39.89 BLADDER PAIN: Primary | ICD-10-CM

## 2024-04-26 DIAGNOSIS — R35.0 URINARY FREQUENCY: ICD-10-CM

## 2024-04-26 LAB
BILIRUBIN, POC: NORMAL
BLOOD URINE, POC: NORMAL
CLARITY, POC: CLEAR
COLOR, POC: YELLOW
GLUCOSE URINE, POC: NORMAL
KETONES, POC: NORMAL
LEUKOCYTE EST, POC: NORMAL
NITRITE, POC: NORMAL
PH, POC: 7
PROTEIN, POC: NORMAL
SPECIFIC GRAVITY, POC: 1.01
UROBILINOGEN, POC: 0.1

## 2024-04-26 RX ORDER — LIDOCAINE HYDROCHLORIDE 20 MG/ML
40 INJECTION, SOLUTION INFILTRATION; PERINEURAL ONCE
Status: COMPLETED | OUTPATIENT
Start: 2024-04-26 | End: 2024-04-26

## 2024-04-26 RX ORDER — AMOXICILLIN AND CLAVULANATE POTASSIUM 875; 125 MG/1; MG/1
TABLET, FILM COATED ORAL
COMMUNITY
Start: 2024-04-25

## 2024-04-26 RX ORDER — AMITRIPTYLINE HYDROCHLORIDE 10 MG/1
10 TABLET, FILM COATED ORAL NIGHTLY
Qty: 30 TABLET | Refills: 0 | Status: SHIPPED | OUTPATIENT
Start: 2024-04-26

## 2024-04-26 RX ORDER — HEPARIN SODIUM 10000 [USP'U]/ML
10000 INJECTION, SOLUTION INTRAVENOUS; SUBCUTANEOUS ONCE
Status: COMPLETED | OUTPATIENT
Start: 2024-04-26 | End: 2024-04-26

## 2024-04-26 RX ADMIN — HEPARIN SODIUM 10000 UNITS: 10000 INJECTION, SOLUTION INTRAVENOUS; SUBCUTANEOUS at 15:51

## 2024-04-26 RX ADMIN — LIDOCAINE HYDROCHLORIDE 40 ML: 20 INJECTION, SOLUTION INFILTRATION; PERINEURAL at 16:17

## 2024-04-26 RX ADMIN — Medication 5 MEQ: at 16:16

## 2024-04-26 NOTE — PROGRESS NOTES
UA 1.015     Blood, UA POC neg     pH, UA 7     Protein, UA POC neg     Urobilinogen, UA 0.1     Leukocytes, UA neg     Nitrite, UA neg        Assessment/Plan:     Debo Blackwell is a 43 y.o. female with   1. Bladder pain    2. Interstitial cystitis    3. Urinary frequency    4. Urethral pain    5. 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  I did send off PCR in light of her hx of Ureaplasma  Also discussed addition of Elavil 10 mg.  R/B reviewed at length including s/s of Serotonin syndrome  She will follow up with me next week  CIPRIANO Garcia CNP    Orders Placed This Encounter   Procedures    Vaginal Pathogens Probes *A    POCT Urinalysis no Micro     Orders Placed This Encounter   Medications    lidocaine 2 % injection 40 mL    heparin (porcine) injection 10,000 Units    sodium bicarbonate 8.4 % injection 5 mEq    hydrocortisone sodium succinate PF (SOLU-CORTEF) injection 100 mg    amitriptyline (ELAVIL) 10 MG tablet     Sig: Take 1 tablet by mouth nightly     Dispense:  30 tablet     Refill:  0       CIPRIANO Garcia - CNP

## 2024-04-27 LAB
REASON FOR REJECTION: NORMAL
REJECTED TEST: NORMAL

## 2024-04-29 ENCOUNTER — TELEPHONE (OUTPATIENT)
Dept: UROGYNECOLOGY | Age: 44
End: 2024-04-29

## 2024-04-29 DIAGNOSIS — R39.89 URETHRAL PAIN: ICD-10-CM

## 2024-04-29 DIAGNOSIS — R39.89 BLADDER PAIN: ICD-10-CM

## 2024-04-29 NOTE — TELEPHONE ENCOUNTER
Informed pt that PCR results have been delayed due to an error on my end. Patient verbalizes understanding of all of the above, and has no further questions or concerns at this time. Encouraged to call back the office should any questions/concerns arise. 4/29/2024 at 10:34 AM.

## 2024-04-30 ENCOUNTER — OFFICE VISIT (OUTPATIENT)
Dept: UROGYNECOLOGY | Age: 44
End: 2024-04-30

## 2024-04-30 VITALS
TEMPERATURE: 98.3 F | OXYGEN SATURATION: 96 % | SYSTOLIC BLOOD PRESSURE: 131 MMHG | RESPIRATION RATE: 16 BRPM | HEART RATE: 80 BPM | DIASTOLIC BLOOD PRESSURE: 86 MMHG

## 2024-04-30 DIAGNOSIS — N30.10 INTERSTITIAL CYSTITIS: ICD-10-CM

## 2024-04-30 DIAGNOSIS — R39.89 BLADDER PAIN: ICD-10-CM

## 2024-04-30 DIAGNOSIS — R39.15 URINARY URGENCY: Primary | ICD-10-CM

## 2024-04-30 LAB
BILIRUBIN, POC: NORMAL
BLOOD URINE, POC: NORMAL
C TRACH DNA SPEC QL NAA+PROBE: NOT DETECTED
CANDIDA RRNA VAG QL PROBE: NOT DETECTED
CANDIDA RRNA VAG QL PROBE: NOT DETECTED
CARBAPENEM RESISTANCE OXA-48 GENE BY PCR: NOT DETECTED
CEPHALOSPORIN RESISTANCE AMPC GENE: NOT DETECTED
CLARITY, POC: CLEAR
COLOR, POC: YELLOW
ESBL RESISTANCE: NOT DETECTED
G VAGINALIS RRNA GENITAL QL PROBE: NOT DETECTED
GLUCOSE URINE, POC: NORMAL
KETONES, POC: NORMAL
LEUKOCYTE EST, POC: NORMAL
M HOMINIS DNA BLD QL NAA+PROBE: NOT DETECTED
MACROLIDE RESISTANCE: NOT DETECTED
METHICILLIN RESISTANCE: NOT DETECTED
MYCOPLASMA DNA SPEC QL NAA+PROBE: NOT DETECTED
N GONORRHOEA DNA SPEC QL NAA+PROBE: NOT DETECTED
NITRITE, POC: NORMAL
OTHER MICROORG DNA SPEC QL NAA+PROBE: ABNORMAL
OTHER MICROORG DNA SPEC QL NAA+PROBE: NOT DETECTED
PH, POC: 6.5
PROTEIN, POC: NORMAL
QUINOLONE AND FLUOROQUINOLONE RESISTANCE: NOT DETECTED
SPECIFIC GRAVITY, POC: 1.01
T VAGINALIS RRNA SPEC QL NAA+PROBE: NOT DETECTED
TETRACYCLINE RESISTANCE: NOT DETECTED
TRIMETHOPRIM/SULFONAMIDE RESISTANCE: NOT DETECTED
UROBILINOGEN, POC: NORMAL

## 2024-04-30 RX ORDER — HEPARIN SODIUM 10000 [USP'U]/ML
10000 INJECTION, SOLUTION INTRAVENOUS; SUBCUTANEOUS ONCE
Status: COMPLETED | OUTPATIENT
Start: 2024-04-30 | End: 2024-04-30

## 2024-04-30 RX ORDER — LIDOCAINE HYDROCHLORIDE 20 MG/ML
40 INJECTION, SOLUTION INFILTRATION; PERINEURAL ONCE
Status: COMPLETED | OUTPATIENT
Start: 2024-04-30 | End: 2024-04-30

## 2024-04-30 RX ADMIN — HEPARIN SODIUM 10000 UNITS: 10000 INJECTION, SOLUTION INTRAVENOUS; SUBCUTANEOUS at 15:55

## 2024-04-30 RX ADMIN — LIDOCAINE HYDROCHLORIDE 40 ML: 20 INJECTION, SOLUTION INFILTRATION; PERINEURAL at 15:53

## 2024-04-30 RX ADMIN — Medication 50 MEQ: at 15:54

## 2024-04-30 NOTE — PROGRESS NOTES
2024     HPI:     Name: Debo Blackwell  YOB: 1980    CC: Debo Blackwell is a 43 y.o. female who is here for follow up of bladder pain.  HPI: How long have you had this problem?  years  Please rate the severity of your problem: mild  Anything make it better? Patient is on Elavil. Patient will be seeing [facility] again on 5/15/24.    Tolerating elavil without concern  She is feeling a bit better since her last visit.  Had a few much improved days    PCR: negative      Ob/Gyn History:    OB History    Para Term  AB Living   2 2       3   SAB IAB Ectopic Molar Multiple Live Births           1 3      # Outcome Date GA Lbr Shahzad/2nd Weight Sex Delivery Anes PTL Lv   2A Para     M CS-LTranv  N SAMANTA      Birth Comments: twins   2B Para     M CS-LTranv  N SAMANTA   1 Para    3.345 kg (7 lb 6 oz)  Vag-Spont  N SAMANTA     Past Medical History:   Past Medical History:   Diagnosis Date    Interstitial cystitis      Past Surgical History: No past surgical history on file.  Current Medications:  Current Outpatient Medications   Medication Sig Dispense Refill    amoxicillin-clavulanate (AUGMENTIN) 875-125 MG per tablet       amitriptyline (ELAVIL) 10 MG tablet Take 1 tablet by mouth nightly 30 tablet 0    acyclovir (ZOVIRAX) 400 MG tablet Take 1 tablet by mouth 2 times daily      escitalopram (LEXAPRO) 10 MG tablet Take 1 tablet by mouth daily      Drospirenone (SLYND) 4 MG TABS Take 4 mg by mouth daily (Patient not taking: Reported on 2024)      nitrofurantoin (MACRODANTIN) 50 MG capsule       PYRIDIUM 200 MG tablet Take 1 tablet by mouth      SPIRONOLACTONE PO Take 100 mg by mouth      trospium (SANCTURA) 60 MG CP24 extended release capsule Take 1 capsule by mouth every morning      valACYclovir (VALTREX) 1 g tablet Take 1 tablet by mouth in the morning and 1 tablet in the evening.       Current Facility-Administered Medications   Medication Dose Route Frequency Provider Last Rate Last Admin

## 2024-05-01 LAB — BACTERIA UR CULT: NORMAL

## 2024-05-02 NOTE — RESULT ENCOUNTER NOTE
Patient made aware of negative urine culture. Patient verbalizes understanding of all of the above, and has no further questions or concerns at this time. Encouraged to call back the office should any questions/concerns arise. 5/2/2024 at 10:45 AM.

## 2024-05-07 ENCOUNTER — OFFICE VISIT (OUTPATIENT)
Dept: UROGYNECOLOGY | Age: 44
End: 2024-05-07
Payer: COMMERCIAL

## 2024-05-07 VITALS
OXYGEN SATURATION: 97 % | TEMPERATURE: 98.4 F | HEART RATE: 78 BPM | DIASTOLIC BLOOD PRESSURE: 72 MMHG | SYSTOLIC BLOOD PRESSURE: 123 MMHG | RESPIRATION RATE: 16 BRPM

## 2024-05-07 DIAGNOSIS — N30.10 INTERSTITIAL CYSTITIS: ICD-10-CM

## 2024-05-07 DIAGNOSIS — R39.89 BLADDER PAIN: ICD-10-CM

## 2024-05-07 DIAGNOSIS — R39.15 URINARY URGENCY: Primary | ICD-10-CM

## 2024-05-07 PROCEDURE — 51700 IRRIGATION OF BLADDER: CPT | Performed by: NURSE PRACTITIONER

## 2024-05-07 PROCEDURE — 81002 URINALYSIS NONAUTO W/O SCOPE: CPT | Performed by: NURSE PRACTITIONER

## 2024-05-07 RX ORDER — LIDOCAINE HYDROCHLORIDE 20 MG/ML
40 INJECTION, SOLUTION INFILTRATION; PERINEURAL ONCE
Status: COMPLETED | OUTPATIENT
Start: 2024-05-07 | End: 2024-05-07

## 2024-05-07 RX ORDER — LIDOCAINE HYDROCHLORIDE 20 MG/ML
20 INJECTION, SOLUTION INFILTRATION; PERINEURAL ONCE
Status: DISCONTINUED | OUTPATIENT
Start: 2024-05-07 | End: 2024-05-07

## 2024-05-07 RX ORDER — HEPARIN SODIUM 10000 [USP'U]/ML
10000 INJECTION, SOLUTION INTRAVENOUS; SUBCUTANEOUS ONCE
Status: COMPLETED | OUTPATIENT
Start: 2024-05-07 | End: 2024-05-07

## 2024-05-07 RX ADMIN — LIDOCAINE HYDROCHLORIDE 40 ML: 20 INJECTION, SOLUTION INFILTRATION; PERINEURAL at 15:44

## 2024-05-07 RX ADMIN — Medication 50 MEQ: at 15:44

## 2024-05-07 RX ADMIN — HEPARIN SODIUM 10000 UNITS: 10000 INJECTION, SOLUTION INTRAVENOUS; SUBCUTANEOUS at 15:45

## 2024-05-07 NOTE — PROGRESS NOTES
2024     HPI:     Name: Debo Blackwell  YOB: 1980    CC: Debo Blackwell is a 43 y.o. female who is here for follow up of bladder pain.  HPI: How long have you had this problem?  years  Please rate the severity of your problem:  mild  Anything make it better?  Patient is on Elavil. She will see PFPT next week. She also reports feeling better this week.    Ob/Gyn History:    OB History    Para Term  AB Living   2 2       3   SAB IAB Ectopic Molar Multiple Live Births           1 3      # Outcome Date GA Lbr Shahzad/2nd Weight Sex Delivery Anes PTL Lv   2A Para     M CS-LTranv  N SAMANTA      Birth Comments: twins   2B Para     M CS-LTranv  N SAMANTA   1 Para    3.345 kg (7 lb 6 oz)  Vag-Spont  N SAMANTA     Past Medical History:   Past Medical History:   Diagnosis Date    Interstitial cystitis      Past Surgical History: History reviewed. No pertinent surgical history.  Current Medications:  Current Outpatient Medications   Medication Sig Dispense Refill    amitriptyline (ELAVIL) 10 MG tablet Take 1 tablet by mouth nightly 30 tablet 0    acyclovir (ZOVIRAX) 400 MG tablet Take 1 tablet by mouth 2 times daily      escitalopram (LEXAPRO) 10 MG tablet Take 1 tablet by mouth daily      SPIRONOLACTONE PO Take 100 mg by mouth      amoxicillin-clavulanate (AUGMENTIN) 875-125 MG per tablet  (Patient not taking: Reported on 2024)      Drospirenone (SLYND) 4 MG TABS Take 4 mg by mouth daily (Patient not taking: Reported on 2024)      nitrofurantoin (MACRODANTIN) 50 MG capsule  (Patient not taking: Reported on 2024)      PYRIDIUM 200 MG tablet Take 1 tablet by mouth (Patient not taking: Reported on 2024)      trospium (SANCTURA) 60 MG CP24 extended release capsule Take 1 capsule by mouth every morning (Patient not taking: Reported on 2024)      valACYclovir (VALTREX) 1 g tablet Take 1 tablet by mouth in the morning and 1 tablet in the evening. (Patient not taking: Reported on 2024)

## 2024-05-08 NOTE — PROGRESS NOTES
[facility] - Outpatient Physical Therapy  [address] [place] [address]  Phone: [phone]  Fax: [phone]        Physical Therapy Daily Treatment Note    Date: 05/15/2024    Patient Name: Debo Blackwell : 1980 MRN: [id number]    Restrictions/Precautions:    Medical/Treatment Diagnosis Information:    Medical Diagnosis:  Bladder pain [R39.89]  Interstitial cystitis [N30.10]       Insurance/Certification information: PT Insurance Information: [employer] - $50 co-pay, 80/20% coverage -   ANTHEM/CARELON  [employer] auth 5 VISITS 24 - 24    Physician Information: Leana Garner APRN*    Plan of care signed (Y/N): Y  Cosigned by: Leana Garner APRN - CNP at 3/7/2024  8:17 AM     Visit# / total visits: 6/10 (total estimated)      approved - FIVE (5) VISITS APPROVED; VALID 24 - 24     Time in: 1600  Time Out: 1715  Total Treatment Time:   75 minutes    Charges:  Therapeutic Activity (85082) x3    Therapeutic Exercise (93837) -    Manual (58930) x2    Neuromuscular Re-ed/Facilitation (72711) -  ________________________________________________________________________________________    Pain Level: mentioned lingering pain after recent vaginal penetration with intercourse    SUBJECTIVE:     Patient had bladder instillations , , , and  since had flare with recent sinus infection.  Feels bladder instillations have been more consistent about once a week for the last several weeks.  Trying to wait about 3 weeks between next potential bladder instillations.     Patient reports started hormone replacement about a year ago - started with topical creams, then tried patch which didn't work and had two rounds of pellets (4 estrogen, 1 testosterone) which increased PMS-type symptoms so thinking she had too much estrogen. Went to different center for hormone replacement in [place] rather than going to [facility] as previous. Frustrated with her current

## 2024-05-13 ENCOUNTER — OFFICE VISIT (OUTPATIENT)
Dept: UROGYNECOLOGY | Age: 44
End: 2024-05-13
Payer: COMMERCIAL

## 2024-05-13 VITALS
OXYGEN SATURATION: 99 % | DIASTOLIC BLOOD PRESSURE: 81 MMHG | RESPIRATION RATE: 16 BRPM | SYSTOLIC BLOOD PRESSURE: 118 MMHG | TEMPERATURE: 98.2 F | HEART RATE: 84 BPM

## 2024-05-13 DIAGNOSIS — N30.10 INTERSTITIAL CYSTITIS: Primary | ICD-10-CM

## 2024-05-13 DIAGNOSIS — R39.15 URINARY URGENCY: ICD-10-CM

## 2024-05-13 DIAGNOSIS — R35.0 URINARY FREQUENCY: ICD-10-CM

## 2024-05-13 DIAGNOSIS — R39.89 BLADDER PAIN: ICD-10-CM

## 2024-05-13 PROCEDURE — 81002 URINALYSIS NONAUTO W/O SCOPE: CPT | Performed by: NURSE PRACTITIONER

## 2024-05-13 PROCEDURE — 51700 IRRIGATION OF BLADDER: CPT | Performed by: NURSE PRACTITIONER

## 2024-05-13 RX ORDER — HEPARIN SODIUM 10000 [USP'U]/ML
10000 INJECTION, SOLUTION INTRAVENOUS; SUBCUTANEOUS ONCE
Status: COMPLETED | OUTPATIENT
Start: 2024-05-13 | End: 2024-05-13

## 2024-05-13 RX ORDER — LIDOCAINE HYDROCHLORIDE 20 MG/ML
40 INJECTION, SOLUTION INFILTRATION; PERINEURAL ONCE
Status: COMPLETED | OUTPATIENT
Start: 2024-05-13 | End: 2024-05-13

## 2024-05-13 RX ADMIN — LIDOCAINE HYDROCHLORIDE 40 ML: 20 INJECTION, SOLUTION INFILTRATION; PERINEURAL at 15:43

## 2024-05-13 RX ADMIN — HEPARIN SODIUM 10000 UNITS: 10000 INJECTION, SOLUTION INTRAVENOUS; SUBCUTANEOUS at 15:45

## 2024-05-13 RX ADMIN — Medication 5 MEQ: at 15:45

## 2024-05-13 NOTE — PROGRESS NOTES
2024     HPI:     Name: Debo Blackwell  YOB: 1980    CC: Debo Blackwell is a 43 y.o. female who is here for follow up of bladder pain.  HPI: How long have you had this problem?  years  Please rate the severity of your problem:  moderate  Anything make it better? Taking Elavil, per patient her last instillation was helpful. Will need refills in a couple weeks.    Having some fatigue in mornings with elavil and trying to take it a little earlier in evening.  She is getting benefit from each instillation    Ob/Gyn History:    OB History    Para Term  AB Living   2 2       3   SAB IAB Ectopic Molar Multiple Live Births           1 3      # Outcome Date GA Lbr Shahzad/2nd Weight Sex Delivery Anes PTL Lv   2A Para     M CS-LTranv  N SAMANTA      Birth Comments: twins   2B Para     M CS-LTranv  N SAMANTA   1 Para    3.345 kg (7 lb 6 oz)  Vag-Spont  N SAMANTA     Past Medical History:   Past Medical History:   Diagnosis Date    Interstitial cystitis      Past Surgical History: History reviewed. No pertinent surgical history.  Current Medications:  Current Outpatient Medications   Medication Sig Dispense Refill    amitriptyline (ELAVIL) 10 MG tablet Take 1 tablet by mouth nightly 30 tablet 0    acyclovir (ZOVIRAX) 400 MG tablet Take 1 tablet by mouth 2 times daily      escitalopram (LEXAPRO) 10 MG tablet Take 1 tablet by mouth daily      SPIRONOLACTONE PO Take 100 mg by mouth      Drospirenone (SLYND) 4 MG TABS Take 4 mg by mouth daily (Patient not taking: Reported on 2024)      nitrofurantoin (MACRODANTIN) 50 MG capsule  (Patient not taking: Reported on 2024)       No current facility-administered medications for this visit.     Allergies: No Known Allergies  Social History:   Social History     Socioeconomic History    Marital status:      Spouse name: Not on file    Number of children: Not on file    Years of education: Not on file    Highest education level: Not on file

## 2024-05-15 ENCOUNTER — HOSPITAL ENCOUNTER (OUTPATIENT)
Dept: PHYSICAL THERAPY | Age: 44
Setting detail: THERAPIES SERIES
Discharge: HOME | End: 2024-05-15
Payer: COMMERCIAL

## 2024-05-15 PROCEDURE — 97530 THERAPEUTIC ACTIVITIES: CPT | Performed by: PHYSICAL THERAPIST

## 2024-05-15 PROCEDURE — 97140 MANUAL THERAPY 1/> REGIONS: CPT | Performed by: PHYSICAL THERAPIST

## 2024-05-23 RX ORDER — AMITRIPTYLINE HYDROCHLORIDE 10 MG/1
10 TABLET, FILM COATED ORAL NIGHTLY
Qty: 30 TABLET | Refills: 0 | Status: SHIPPED | OUTPATIENT
Start: 2024-05-23

## 2024-05-29 ENCOUNTER — APPOINTMENT (OUTPATIENT)
Dept: PHYSICAL THERAPY | Age: 44
End: 2024-05-29
Payer: COMMERCIAL

## 2024-05-29 NOTE — PROGRESS NOTES
[facility] - Outpatient Physical Therapy  [address] [place] [address]  Phone: [phone]  Fax: [phone]        Physical Therapy Daily Treatment Note    Date: 2024    Patient Name: Debo Blackwell : 1980 MRN: [id number]    Restrictions/Precautions:    Medical/Treatment Diagnosis Information:    Medical Diagnosis:  Bladder pain [R39.89]  Interstitial cystitis [N30.10]       Insurance/Certification information: PT Insurance Information: [employer] - $50 co-pay, 80/20% coverage -   ANTHEM/CARELON  [employer] auth 5 VISITS 24 - 24    Physician Information: Leana Garner APRN*    Plan of care signed (Y/N): Y  Cosigned by: Leana Garner APRN - CNP at 3/7/2024  8:17 AM     Visit# / total visits: 7/10 (total estimated)     2/ approved - FIVE (5) VISITS APPROVED; VALID 24 - 24     Time in: 13***  Time Out: 14***  Total Treatment Time:   *** minutes    Charges: *** Therapeutic Activity (90652) x3    Therapeutic Exercise (39465) -    Manual (80801) x2    Neuromuscular Re-ed/Facilitation (78545) -  ________________________________________________________________________________________    Pain Level: mentioned lingering pain after recent vaginal penetration with intercourse    SUBJECTIVE:     Patient had bladder instillations , , , and  since had flare with recent sinus infection.  Feels bladder instillations have been more consistent about once a week for the last several weeks.  Trying to wait about 3 weeks between next potential bladder instillations.     Patient reports started hormone replacement about a year ago - started with topical creams, then tried patch which didn't work and had two rounds of pellets (4 estrogen, 1 testosterone) which increased PMS-type symptoms so thinking she had too much estrogen. Went to different center for hormone replacement in [place] rather than going to [facility] as previous. Frustrated with her current

## 2024-06-04 ENCOUNTER — HOSPITAL ENCOUNTER (OUTPATIENT)
Dept: PHYSICAL THERAPY | Age: 44
Setting detail: THERAPIES SERIES
Discharge: HOME | End: 2024-06-04

## 2024-06-12 NOTE — PROGRESS NOTES
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   [] Progressing: [x] Met: [] Not Met: [] Adjusted  4. Patient will demonstrate improved confidence in her ability to manage bladder symptoms reporting little to no pain with daily activities and after sex.   [] Progressing: [x] Met: [] Not Met: [] Adjusted       5. Report using 3-4 behavioral modification strategies to reduce urinary/bowel complaints through dietary and mechanical changes, with focus on full emptying of bladder with each urination and using optimal positioning and deep breathing for relaxation of posterior PF muscles during defacation, reducing need to strain.  [] Progressing: [x] Met: [] Not Met: [] Adjusted       6. Rate severity of the problem related to urinary frequency and urgency and/or pelvic pain to 3 or less on scale of 0-10 with 0 being no problem and 10 being the worst - (8/10 reported at intial eval). - met - 6/25/2024 - rates severity at 2/10  [] Progressing: [x] Met: [] Not Met: [] Adjusted       7. Perform HEP for pelvic floor and core muscle groups independently as she progresses to self-manage her pelvic pressure/pain and PF and surrounding core muscle weakness and/or tightness.  [] Progressing: [x] Met: [] Not Met: [] Adjusted                      Plan:   Discontinue PF PT. Will continue with integrated home program.       Electronically signed by SHANE MORIN, PT #[id number] on 6/25/2024 at 8:05 AM #[id number]      Note: If patient does not return for scheduled/recommended follow up visits, this note will serve as a discharge from care along with the most recent update on progress.

## 2024-06-25 ENCOUNTER — HOSPITAL ENCOUNTER (OUTPATIENT)
Dept: PHYSICAL THERAPY | Age: 44
Setting detail: THERAPIES SERIES
Discharge: HOME | End: 2024-06-25
Payer: COMMERCIAL

## 2024-06-25 PROCEDURE — 97530 THERAPEUTIC ACTIVITIES: CPT | Performed by: PHYSICAL THERAPIST

## 2024-07-23 ENCOUNTER — OFFICE VISIT (OUTPATIENT)
Dept: UROGYNECOLOGY | Age: 44
End: 2024-07-23
Payer: COMMERCIAL

## 2024-07-23 VITALS
TEMPERATURE: 98.3 F | HEART RATE: 65 BPM | DIASTOLIC BLOOD PRESSURE: 86 MMHG | RESPIRATION RATE: 18 BRPM | SYSTOLIC BLOOD PRESSURE: 128 MMHG | OXYGEN SATURATION: 97 %

## 2024-07-23 DIAGNOSIS — R39.89 BLADDER PAIN: Primary | ICD-10-CM

## 2024-07-23 DIAGNOSIS — N30.10 INTERSTITIAL CYSTITIS: ICD-10-CM

## 2024-07-23 PROCEDURE — 51700 IRRIGATION OF BLADDER: CPT | Performed by: NURSE PRACTITIONER

## 2024-07-23 PROCEDURE — 81002 URINALYSIS NONAUTO W/O SCOPE: CPT | Performed by: NURSE PRACTITIONER

## 2024-07-23 PROCEDURE — 99213 OFFICE O/P EST LOW 20 MIN: CPT | Performed by: NURSE PRACTITIONER

## 2024-07-23 RX ORDER — CICLOPIROX 80 MG/ML
SOLUTION TOPICAL
COMMUNITY
Start: 2024-07-22

## 2024-07-23 RX ORDER — LIDOCAINE HYDROCHLORIDE 20 MG/ML
40 INJECTION, SOLUTION INFILTRATION; PERINEURAL ONCE
Status: COMPLETED | OUTPATIENT
Start: 2024-07-23 | End: 2024-07-23

## 2024-07-23 RX ORDER — HEPARIN SODIUM 10000 [USP'U]/ML
10000 INJECTION, SOLUTION INTRAVENOUS; SUBCUTANEOUS ONCE
Status: COMPLETED | OUTPATIENT
Start: 2024-07-23 | End: 2024-07-23

## 2024-07-23 RX ORDER — PROGESTERONE 200 MG/1
CAPSULE ORAL DAILY
COMMUNITY

## 2024-07-23 RX ADMIN — LIDOCAINE HYDROCHLORIDE 40 ML: 20 INJECTION, SOLUTION INFILTRATION; PERINEURAL at 15:26

## 2024-07-23 RX ADMIN — Medication 50 MEQ: at 15:27

## 2024-07-23 RX ADMIN — HEPARIN SODIUM 10000 UNITS: 10000 INJECTION, SOLUTION INTRAVENOUS; SUBCUTANEOUS at 15:26

## 2024-07-23 NOTE — PROGRESS NOTES
diet  RTO prn for bladder symptoms  No orders of the defined types were placed in this encounter.    No orders of the defined types were placed in this encounter.      Leana Garner, APRN - CNP

## 2024-08-28 RX ORDER — AMITRIPTYLINE HYDROCHLORIDE 10 MG/1
10 TABLET ORAL NIGHTLY
Qty: 30 TABLET | Refills: 2 | Status: SHIPPED | OUTPATIENT
Start: 2024-08-28

## 2024-09-04 ENCOUNTER — OFFICE VISIT (OUTPATIENT)
Dept: UROGYNECOLOGY | Age: 44
End: 2024-09-04

## 2024-09-04 ENCOUNTER — TELEPHONE (OUTPATIENT)
Dept: UROGYNECOLOGY | Age: 44
End: 2024-09-04

## 2024-09-04 VITALS
SYSTOLIC BLOOD PRESSURE: 117 MMHG | HEART RATE: 70 BPM | RESPIRATION RATE: 16 BRPM | DIASTOLIC BLOOD PRESSURE: 79 MMHG | OXYGEN SATURATION: 99 % | TEMPERATURE: 97.8 F

## 2024-09-04 DIAGNOSIS — R39.89 BLADDER PAIN: ICD-10-CM

## 2024-09-04 DIAGNOSIS — R39.89 URETHRAL PAIN: Primary | ICD-10-CM

## 2024-09-04 RX ORDER — LIDOCAINE HYDROCHLORIDE 10 MG/ML
40 INJECTION, SOLUTION INFILTRATION; PERINEURAL ONCE
Status: COMPLETED | OUTPATIENT
Start: 2024-09-04 | End: 2024-09-04

## 2024-09-04 RX ORDER — LIDOCAINE HYDROCHLORIDE 20 MG/ML
JELLY TOPICAL ONCE
Status: COMPLETED | OUTPATIENT
Start: 2024-09-04 | End: 2024-09-04

## 2024-09-04 RX ORDER — HEPARIN SODIUM 10000 [USP'U]/ML
10000 INJECTION, SOLUTION INTRAVENOUS; SUBCUTANEOUS ONCE
Status: COMPLETED | OUTPATIENT
Start: 2024-09-04 | End: 2024-09-04

## 2024-09-04 RX ADMIN — LIDOCAINE HYDROCHLORIDE: 20 JELLY TOPICAL at 16:12

## 2024-09-04 RX ADMIN — LIDOCAINE HYDROCHLORIDE 40 ML: 10 INJECTION, SOLUTION INFILTRATION; PERINEURAL at 16:11

## 2024-09-04 RX ADMIN — Medication 5 MEQ: at 16:11

## 2024-09-04 RX ADMIN — HEPARIN SODIUM 10000 UNITS: 10000 INJECTION, SOLUTION INTRAVENOUS; SUBCUTANEOUS at 16:10

## 2024-09-04 NOTE — PROGRESS NOTES
2024     HPI:     Name: Debo Blackwell  YOB: 1980    CC: Debo Blackwell is a 43 y.o. female who is here for follow up of bladder pain.  HPI: How long have you had this problem?  years  Please rate the severity of your problem:  moderate  Anything make it better? Instillations.     Debo presents today with c/o urethral burning  She is uncertain this is related to her IC and would like to assure no UTI  She does have a hx of Ureaplasma    Ob/Gyn History:    OB History    Para Term  AB Living   2 2       3   SAB IAB Ectopic Molar Multiple Live Births           1 3      # Outcome Date GA Lbr Shahzad/2nd Weight Sex Type Anes PTL Lv   2A Para     M CS-LTranv  N SAMANTA      Birth Comments: twins   2B Para     M CS-LTranv  N SAMANTA   1 Para    3.345 kg (7 lb 6 oz)  Vag-Spont  N SAMANTA     Past Medical History:   Past Medical History:   Diagnosis Date    Interstitial cystitis      Past Surgical History: History reviewed. No pertinent surgical history.  Current Medications:  Current Outpatient Medications   Medication Sig Dispense Refill    amitriptyline (ELAVIL) 10 MG tablet TAKE 1 TABLET BY MOUTH EVERY DAY AT NIGHT 30 tablet 2    progesterone (PROMETRIUM) 200 MG CAPS capsule Take by mouth daily      escitalopram (LEXAPRO) 10 MG tablet Take 1 tablet by mouth daily      SPIRONOLACTONE PO Take 100 mg by mouth      ciclopirox (PENLAC) 8 % solution  (Patient not taking: Reported on 2024)      Drospirenone (SLYND) 4 MG TABS Take 4 mg by mouth daily (Patient not taking: Reported on 2024)       No current facility-administered medications for this visit.     Allergies: No Known Allergies  Social History:   Social History     Socioeconomic History    Marital status:      Spouse name: Not on file    Number of children: Not on file    Years of education: Not on file    Highest education level: Not on file   Occupational History    Not on file   Tobacco Use    Smoking status: Never

## 2024-09-04 NOTE — TELEPHONE ENCOUNTER
Called patient back, advised her that per YASMIN Dueñas she does not feel like her back pain is linked to any of her bladder issues. Patient verbalized understanding.

## 2024-09-05 LAB
BILIRUBIN, POC: NORMAL
BLOOD URINE, POC: NORMAL
CLARITY, POC: CLEAR
COLOR, POC: YELLOW
GLUCOSE URINE, POC: NORMAL MG/DL
KETONES, POC: NORMAL MG/DL
LEUKOCYTE EST, POC: NORMAL
NITRITE, POC: NORMAL
PH, POC: 6
PROTEIN, POC: NORMAL MG/DL
SPECIFIC GRAVITY, POC: 1.01
UROBILINOGEN, POC: NORMAL MG/DL

## 2024-09-06 LAB
C TRACH DNA SPEC QL NAA+PROBE: NOT DETECTED
CANDIDA RRNA VAG QL PROBE: NOT DETECTED
CANDIDA RRNA VAG QL PROBE: NOT DETECTED
CARBAPENEM RESISTANCE OXA-48 GENE BY PCR: NOT DETECTED
CEPHALOSPORIN RESISTANCE AMPC GENE: NOT DETECTED
ESBL RESISTANCE: NOT DETECTED
G VAGINALIS RRNA GENITAL QL PROBE: ABNORMAL
M HOMINIS DNA BLD QL NAA+PROBE: NOT DETECTED
MACROLIDE RESISTANCE: NOT DETECTED
METHICILLIN RESISTANCE: NOT DETECTED
MYCOPLASMA DNA SPEC QL NAA+PROBE: NOT DETECTED
N GONORRHOEA DNA SPEC QL NAA+PROBE: NOT DETECTED
OTHER MICROORG DNA SPEC QL NAA+PROBE: ABNORMAL
OTHER MICROORG DNA SPEC QL NAA+PROBE: NOT DETECTED
QUINOLONE AND FLUOROQUINOLONE RESISTANCE: NOT DETECTED
T VAGINALIS RRNA SPEC QL NAA+PROBE: NOT DETECTED
TETRACYCLINE RESISTANCE: ABNORMAL
TRIMETHOPRIM/SULFONAMIDE RESISTANCE: NOT DETECTED

## 2024-09-06 NOTE — RESULT ENCOUNTER NOTE
Informed patient of negative cultures. Patient verbalizes understanding of all of the above, and has no further questions or concerns at this time. Encouraged to call back the office should any questions/concerns arise. 9/6/2024 at 10:20 AM.

## 2024-12-05 RX ORDER — AMITRIPTYLINE HYDROCHLORIDE 10 MG/1
10 TABLET ORAL NIGHTLY
Qty: 30 TABLET | Refills: 2 | OUTPATIENT
Start: 2024-12-05

## 2025-01-08 ENCOUNTER — TELEPHONE (OUTPATIENT)
Dept: UROGYNECOLOGY | Age: 45
End: 2025-01-08

## 2025-01-08 RX ORDER — AMITRIPTYLINE HYDROCHLORIDE 10 MG/1
10 TABLET ORAL NIGHTLY
Qty: 30 TABLET | Refills: 2 | Status: SHIPPED | OUTPATIENT
Start: 2025-01-08

## 2025-01-08 NOTE — TELEPHONE ENCOUNTER
Spoke with patient and informed her that Leana Garner NP stated it was okay for her to have refill on 3 month supply of Elavil. Refills sent to patient's preferred pharmacy. No further needs identified at this time. Electronically signed by Ammy Bazan RN on 1/8/2025 at 4:23 PM

## 2025-01-08 NOTE — TELEPHONE ENCOUNTER
Called patient to confirm medication she is requesting refill on - per YASMIN Dueñas okay to send 3 month supply of Elavil if this is what is being requested. Will await call back to confirm.

## 2025-02-14 ENCOUNTER — OFFICE VISIT (OUTPATIENT)
Dept: UROGYNECOLOGY | Age: 45
End: 2025-02-14

## 2025-02-14 VITALS
SYSTOLIC BLOOD PRESSURE: 125 MMHG | OXYGEN SATURATION: 100 % | TEMPERATURE: 100 F | DIASTOLIC BLOOD PRESSURE: 64 MMHG | RESPIRATION RATE: 16 BRPM | HEART RATE: 67 BPM

## 2025-02-14 DIAGNOSIS — N30.10 INTERSTITIAL CYSTITIS: ICD-10-CM

## 2025-02-14 DIAGNOSIS — R39.89 BLADDER PAIN: Primary | ICD-10-CM

## 2025-02-14 LAB
BILIRUBIN, POC: NORMAL
BLOOD URINE, POC: NORMAL
CLARITY, POC: CLEAR
COLOR, POC: YELLOW
GLUCOSE URINE, POC: NORMAL MG/DL
KETONES, POC: NORMAL MG/DL
LEUKOCYTE EST, POC: NORMAL
NITRITE, POC: NORMAL
PH, POC: 6.5
PROTEIN, POC: NORMAL MG/DL
SPECIFIC GRAVITY, POC: 1.01
UROBILINOGEN, POC: NORMAL MG/DL

## 2025-02-14 RX ORDER — HYDROCORTISONE SODIUM SUCCINATE 100 MG/2ML
100 INJECTION INTRAMUSCULAR; INTRAVENOUS ONCE
Status: COMPLETED | OUTPATIENT
Start: 2025-02-14 | End: 2025-02-14

## 2025-02-14 RX ORDER — LIDOCAINE HYDROCHLORIDE 20 MG/ML
JELLY TOPICAL ONCE
Status: COMPLETED | OUTPATIENT
Start: 2025-02-14 | End: 2025-02-14

## 2025-02-14 RX ORDER — LIDOCAINE HYDROCHLORIDE 20 MG/ML
40 INJECTION, SOLUTION INFILTRATION; PERINEURAL ONCE
Status: COMPLETED | OUTPATIENT
Start: 2025-02-14 | End: 2025-02-14

## 2025-02-14 RX ORDER — HEPARIN SODIUM 10000 [USP'U]/ML
10000 INJECTION, SOLUTION INTRAVENOUS; SUBCUTANEOUS ONCE
Status: COMPLETED | OUTPATIENT
Start: 2025-02-14 | End: 2025-02-14

## 2025-02-14 RX ADMIN — HYDROCORTISONE SODIUM SUCCINATE 100 MG: 100 INJECTION INTRAMUSCULAR; INTRAVENOUS at 13:41

## 2025-02-14 RX ADMIN — Medication 5 MEQ: at 13:39

## 2025-02-14 RX ADMIN — LIDOCAINE HYDROCHLORIDE 40 ML: 20 INJECTION, SOLUTION INFILTRATION; PERINEURAL at 13:39

## 2025-02-14 RX ADMIN — LIDOCAINE HYDROCHLORIDE: 20 JELLY TOPICAL at 13:42

## 2025-02-14 RX ADMIN — HEPARIN SODIUM 10000 UNITS: 10000 INJECTION, SOLUTION INTRAVENOUS; SUBCUTANEOUS at 13:37

## 2025-02-14 NOTE — PROGRESS NOTES
Smokeless tobacco: Never   Vaping Use    Vaping status: Never Used   Substance and Sexual Activity    Alcohol use: Yes     Comment: rarely    Drug use: Never    Sexual activity: Yes   Other Topics Concern    Not on file   Social History Narrative    Not on file     Social Determinants of Health     Financial Resource Strain: Not on file   Food Insecurity: Unknown (1/22/2024)    Received from [facility], [facility]    Food Insecurities     Worried about running out of food: Not on file     Food Bought: Not on file   Transportation Needs: Unknown (1/22/2024)    [facility]    Transportation     Worried about transportation: Not on file   Physical Activity: Not on file   Stress: Not on file   Social Connections: Not on file   Intimate Partner Violence: Unknown (1/22/2024)    [facility]    Interpersonal Safety     Feel physically or emotionally unsafe where currently live: Not on file     Harm by anyone: Not on file     Emotionally Harmed: Not on file   Housing Stability: Unknown (1/22/2024)    Received from [facility]    Housing/Utilities     Worried about losing home: Not on file     Stayed outside house: Not on file     Unable to get utilities: Not on file     Family History: History reviewed. No pertinent family history.      Objective:     Vitals  Vitals:    02/14/25 1321   BP: 125/64   Pulse: 67   Resp: 16   Temp: 100 °F (37.8 °C)   SpO2: 100%     Physical Exam  Physical Exam  Vitals and nursing note reviewed.   Constitutional:       Appearance: Normal appearance.   HENT:      Head: Normocephalic.   Eyes:      Conjunctiva/sclera: Conjunctivae normal.   Cardiovascular:      Rate and Rhythm: Normal rate.   Pulmonary:

## 2025-04-07 RX ORDER — AMITRIPTYLINE HYDROCHLORIDE 10 MG/1
10 TABLET ORAL NIGHTLY
Qty: 30 TABLET | Refills: 2 | Status: SHIPPED | OUTPATIENT
Start: 2025-04-07

## 2025-07-14 RX ORDER — AMITRIPTYLINE HYDROCHLORIDE 10 MG/1
10 TABLET ORAL NIGHTLY
Qty: 30 TABLET | Refills: 2 | Status: SHIPPED | OUTPATIENT
Start: 2025-07-14

## 2025-07-28 ENCOUNTER — OFFICE VISIT (OUTPATIENT)
Dept: UROGYNECOLOGY | Age: 45
End: 2025-07-28

## 2025-07-28 VITALS
RESPIRATION RATE: 16 BRPM | TEMPERATURE: 97.7 F | HEART RATE: 71 BPM | SYSTOLIC BLOOD PRESSURE: 132 MMHG | DIASTOLIC BLOOD PRESSURE: 89 MMHG | OXYGEN SATURATION: 99 %

## 2025-07-28 DIAGNOSIS — N30.10 INTERSTITIAL CYSTITIS: Primary | ICD-10-CM

## 2025-07-28 DIAGNOSIS — R35.0 URINARY FREQUENCY: ICD-10-CM

## 2025-07-28 DIAGNOSIS — R39.89 BLADDER PAIN: ICD-10-CM

## 2025-07-28 LAB
BILIRUBIN, POC: NORMAL
BLOOD URINE, POC: NORMAL
CLARITY, POC: CLEAR
COLOR, POC: YELLOW
GLUCOSE URINE, POC: NORMAL MG/DL
KETONES, POC: NORMAL MG/DL
LEUKOCYTE EST, POC: NORMAL
NITRITE, POC: NORMAL
PH, POC: 8.5
PROTEIN, POC: NORMAL MG/DL
SPECIFIC GRAVITY, POC: 1.01
UROBILINOGEN, POC: NORMAL MG/DL

## 2025-07-28 RX ORDER — HYDROCORTISONE SODIUM SUCCINATE 100 MG/2ML
100 INJECTION INTRAMUSCULAR; INTRAVENOUS ONCE
Status: COMPLETED | OUTPATIENT
Start: 2025-07-28 | End: 2025-07-28

## 2025-07-28 RX ORDER — INDOMETHACIN 25 MG/1
5 CAPSULE ORAL ONCE
Status: COMPLETED | OUTPATIENT
Start: 2025-07-28 | End: 2025-07-28

## 2025-07-28 RX ORDER — HEPARIN SODIUM 10000 [USP'U]/ML
10000 INJECTION, SOLUTION INTRAVENOUS; SUBCUTANEOUS ONCE
Status: COMPLETED | OUTPATIENT
Start: 2025-07-28 | End: 2025-07-28

## 2025-07-28 RX ORDER — LIDOCAINE HYDROCHLORIDE 20 MG/ML
40 INJECTION, SOLUTION INFILTRATION; PERINEURAL ONCE
Status: COMPLETED | OUTPATIENT
Start: 2025-07-28 | End: 2025-07-28

## 2025-07-28 RX ORDER — LIDOCAINE HYDROCHLORIDE 20 MG/ML
JELLY TOPICAL ONCE
Status: COMPLETED | OUTPATIENT
Start: 2025-07-28 | End: 2025-07-28

## 2025-07-28 RX ADMIN — INDOMETHACIN 5 MEQ: 25 CAPSULE ORAL at 14:17

## 2025-07-28 RX ADMIN — HEPARIN SODIUM 10000 UNITS: 10000 INJECTION, SOLUTION INTRAVENOUS; SUBCUTANEOUS at 14:18

## 2025-07-28 RX ADMIN — HYDROCORTISONE SODIUM SUCCINATE 100 MG: 100 INJECTION INTRAMUSCULAR; INTRAVENOUS at 14:16

## 2025-07-28 RX ADMIN — LIDOCAINE HYDROCHLORIDE: 20 JELLY TOPICAL at 14:17

## 2025-07-28 RX ADMIN — LIDOCAINE HYDROCHLORIDE 40 ML: 20 INJECTION, SOLUTION INFILTRATION; PERINEURAL at 14:17

## 2025-07-28 NOTE — PROGRESS NOTES
2025     HPI:     Name: Debo Blackwell  YOB: 1980    CC: Debo Blackwell is a 44 y.o. female who is here for follow up of bladder pain.    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   How long have you had this problem?    Please rate the severity of your problem:  moderate  Anything make it better? Instillations and diet modifications have been improving pain    Ob/Gyn History:    OB History    Para Term  AB Living   2 2    3   SAB IAB Ectopic Molar Multiple Live Births       1 3      # Outcome Date GA Lbr Shahzad/2nd Weight Sex Type Anes PTL Lv   2A Para     M CS-LTranv  N SAMANTA      Birth Comments: twins   2B Para     M CS-LTranv  N SAMANTA   1 Para    3.345 kg (7 lb 6 oz)  Vag-Spont  N SAMANTA     Past Medical History:   Past Medical History:   Diagnosis Date    Interstitial cystitis      Past Surgical History: History reviewed. No pertinent surgical history.  Current Medications:  Current Outpatient Medications   Medication Sig Dispense Refill    amitriptyline (ELAVIL) 10 MG tablet TAKE 1 TABLET BY MOUTH EVERY DAY AT NIGHT 30 tablet 2    ciclopirox (PENLAC) 8 % solution  (Patient not taking: Reported on 2024)      progesterone (PROMETRIUM) 200 MG CAPS capsule Take by mouth daily      Drospirenone (SLYND) 4 MG TABS Take 4 mg by mouth daily (Patient not taking: Reported on 2024)      escitalopram (LEXAPRO) 10 MG tablet Take 1 tablet by mouth daily      SPIRONOLACTONE PO Take 100 mg by mouth       No current facility-administered medications for this visit.     Allergies: No Known Allergies  Social History:   Social History     Socioeconomic History    Marital status:      Spouse name: Not on file    Number of children: Not on file    Years of education: Not on file    Highest education level: Not on file   Occupational History    Not on file